# Patient Record
Sex: FEMALE | Race: BLACK OR AFRICAN AMERICAN | Employment: FULL TIME | URBAN - METROPOLITAN AREA
[De-identification: names, ages, dates, MRNs, and addresses within clinical notes are randomized per-mention and may not be internally consistent; named-entity substitution may affect disease eponyms.]

---

## 2019-05-08 ENCOUNTER — TELEPHONE (OUTPATIENT)
Dept: OBGYN CLINIC | Facility: CLINIC | Age: 49
End: 2019-05-08

## 2019-05-08 NOTE — TELEPHONE ENCOUNTER
PT SCHEDULED AN APPT WITH [facility] ON 5-14-19 FOR PHYSICAL AND SHE IS A NEW PT.  PT WANTS THE CODES FOR THE EXAM AND FOR ANY TESTING SHE WILL HAVE DONE. PT WILL BE REQUESTING PAP, HPV AND FULL STD SCREENING.   WILL ATTEMPT TO GET THE CODES TO PT.

## 2019-05-10 NOTE — TELEPHONE ENCOUNTER
LMTCB.  CPT CODE FOR THE APPT IS R9162573, CPT CODES FOR HEPBSAG = 69779, HEP C AB = [id number], TREP (SYPHILLUS) = [id number] AND HIV = [id number]. WAS UNABLE TO GET ANY OTHER CODES FOR PAP WITH GC/CHL,TRICH.

## 2019-05-14 ENCOUNTER — OFFICE VISIT (OUTPATIENT)
Dept: OBGYN CLINIC | Facility: CLINIC | Age: 49
End: 2019-05-14
Payer: COMMERCIAL

## 2019-05-14 ENCOUNTER — LAB ENCOUNTER (OUTPATIENT)
Dept: LAB | Facility: HOSPITAL | Age: 49
End: 2019-05-14
Attending: OBSTETRICS & GYNECOLOGY
Payer: COMMERCIAL

## 2019-05-14 VITALS — DIASTOLIC BLOOD PRESSURE: 86 MMHG | HEART RATE: 83 BPM | SYSTOLIC BLOOD PRESSURE: 139 MMHG | WEIGHT: 231 LBS

## 2019-05-14 DIAGNOSIS — Z12.31 VISIT FOR SCREENING MAMMOGRAM: ICD-10-CM

## 2019-05-14 DIAGNOSIS — Z01.419 ENCOUNTER FOR GYNECOLOGICAL EXAMINATION WITHOUT ABNORMAL FINDING: Primary | ICD-10-CM

## 2019-05-14 DIAGNOSIS — Z11.3 VENEREAL DISEASE SCREENING: ICD-10-CM

## 2019-05-14 DIAGNOSIS — R19.00 PELVIC MASS: ICD-10-CM

## 2019-05-14 PROCEDURE — 99212 OFFICE O/P EST SF 10 MIN: CPT | Performed by: OBSTETRICS & GYNECOLOGY

## 2019-05-14 PROCEDURE — 86780 TREPONEMA PALLIDUM: CPT

## 2019-05-14 PROCEDURE — 87389 HIV-1 AG W/HIV-1&-2 AB AG IA: CPT

## 2019-05-14 PROCEDURE — 87340 HEPATITIS B SURFACE AG IA: CPT

## 2019-05-14 PROCEDURE — 36415 COLL VENOUS BLD VENIPUNCTURE: CPT

## 2019-05-14 PROCEDURE — 99386 PREV VISIT NEW AGE 40-64: CPT | Performed by: OBSTETRICS & GYNECOLOGY

## 2019-05-14 NOTE — PROGRESS NOTES
Vandana Loredo is a 51year old female [id number] Patient's last menstrual period was 04/28/2019 (approximate). Patient presents with:  Gyn Exam: NP, Annual, mammogram order. She has no gyne complaints.   She has not been sexually active in several months of clubs or organizations: Not on file        Relationship status: Not on file      Intimate partner violence:        Fear of current or ex partner: Not on file        Emotionally abused: Not on file        Physically abused: Not on file        Forced sexu significant murmur  Abdomen:  soft, +tenderness to umbilicus with firm palpable mass, nondistended  Skin/Hair: no unusual rashes or bruises  Extremities: no edema, no cyanosis  Psychiatric:  Oriented to time, place, person and situation.  Appropriate mood a

## 2019-05-20 ENCOUNTER — TELEPHONE (OUTPATIENT)
Dept: OBGYN CLINIC | Facility: CLINIC | Age: 49
End: 2019-05-20

## 2019-05-20 NOTE — TELEPHONE ENCOUNTER
----- Message from Alexsandra Little MD sent at 5/17/2019  7:36 AM CDT -----  Negative syphylis,hepatitis,gc/chlamydia,HIV ,call pt

## 2019-05-21 ENCOUNTER — HOSPITAL ENCOUNTER (OUTPATIENT)
Dept: MAMMOGRAPHY | Age: 49
Discharge: HOME | End: 2019-05-21
Attending: OBSTETRICS & GYNECOLOGY
Payer: COMMERCIAL

## 2019-05-21 ENCOUNTER — HOSPITAL ENCOUNTER (OUTPATIENT)
Dept: ULTRASOUND IMAGING | Age: 49
Discharge: HOME | End: 2019-05-21
Attending: OBSTETRICS & GYNECOLOGY
Payer: COMMERCIAL

## 2019-05-21 DIAGNOSIS — Z12.31 VISIT FOR SCREENING MAMMOGRAM: ICD-10-CM

## 2019-05-21 DIAGNOSIS — R19.00 PELVIC MASS: ICD-10-CM

## 2019-05-21 PROCEDURE — 77067 SCR MAMMO BI INCL CAD: CPT | Performed by: OBSTETRICS & GYNECOLOGY

## 2019-05-21 PROCEDURE — 76830 TRANSVAGINAL US NON-OB: CPT | Performed by: OBSTETRICS & GYNECOLOGY

## 2019-05-21 PROCEDURE — 77063 BREAST TOMOSYNTHESIS BI: CPT | Performed by: OBSTETRICS & GYNECOLOGY

## 2019-05-21 PROCEDURE — 76856 US EXAM PELVIC COMPLETE: CPT | Performed by: OBSTETRICS & GYNECOLOGY

## 2019-05-26 ENCOUNTER — PATIENT MESSAGE (OUTPATIENT)
Dept: OBGYN CLINIC | Facility: CLINIC | Age: 49
End: 2019-05-26

## 2019-05-28 ENCOUNTER — TELEPHONE (OUTPATIENT)
Dept: OBGYN CLINIC | Facility: CLINIC | Age: 49
End: 2019-05-28

## 2019-05-28 NOTE — TELEPHONE ENCOUNTER
See other my chart message as well.      [facility] recs for pts US:   Notes recorded by Ave Rosa MD on 5/26/2019 at 7:21 PM CDT  Per our plan at visit, pt is supposed to come in and discuss these results with me and will make appt    Recs for mammo:  Note

## 2019-05-28 NOTE — TELEPHONE ENCOUNTER
Pt was told to schedule appt to see [facility] to discuss mammo u/s results, but [place] and wont be able to call off work again anytime soon.  Pt is available on Fridays and since [facility] has no other availability on Fridays, pt is asking if todays 3pm zakia

## 2019-05-28 NOTE — TELEPHONE ENCOUNTER
From: Faye Valente  To: Jenn De La Cruz MD  Sent: 5/26/2019 1:36 PM CDT  Subject: Test Results Question    I have a question about [facility] SOURAV 2D+3D SCREENING BILAT (CPT=77067/25080) resulted on 5/22/19, 7:18 AM.    How do I set up additional diagnostic

## 2019-05-28 NOTE — TELEPHONE ENCOUNTER
Pt states she is in [address] on vacation and requesting a sooner appt than August to come in to discuss results. Pt accepted first available appt on a Friday 6/14. Pt also put on waitlist for a Friday appt.

## 2019-05-28 NOTE — TELEPHONE ENCOUNTER
From: Bobby Menjivar  To: Luz Kay MD  Sent: 5/26/2019 1:32 PM CDT  Subject: Test Results Question    I have a question about Ultrasound Scan Pelvis resulted on 5/22/19, 12:05 PM. I'd like to schedule the pelvic MRI as soon as practicable and on

## 2019-06-06 ENCOUNTER — TELEPHONE (OUTPATIENT)
Dept: FAMILY MEDICINE CLINIC | Facility: CLINIC | Age: 49
End: 2019-06-06

## 2019-06-06 ENCOUNTER — LAB ENCOUNTER (OUTPATIENT)
Dept: LAB | Age: 49
End: 2019-06-06
Attending: FAMILY MEDICINE
Payer: COMMERCIAL

## 2019-06-06 ENCOUNTER — OFFICE VISIT (OUTPATIENT)
Dept: FAMILY MEDICINE CLINIC | Facility: CLINIC | Age: 49
End: 2019-06-06
Payer: COMMERCIAL

## 2019-06-06 VITALS
RESPIRATION RATE: 17 BRPM | HEART RATE: 77 BPM | BODY MASS INDEX: 40.75 KG/M2 | SYSTOLIC BLOOD PRESSURE: 144 MMHG | DIASTOLIC BLOOD PRESSURE: 80 MMHG | HEIGHT: 63 IN | WEIGHT: 230 LBS

## 2019-06-06 DIAGNOSIS — R09.81 NASAL SINUS CONGESTION: ICD-10-CM

## 2019-06-06 DIAGNOSIS — M79.89 LEFT LEG SWELLING: ICD-10-CM

## 2019-06-06 DIAGNOSIS — R03.0 ELEVATED BLOOD PRESSURE READING: ICD-10-CM

## 2019-06-06 DIAGNOSIS — Z67.90 UNSPECIFIED BLOOD TYPE, RH POSITIVE: ICD-10-CM

## 2019-06-06 DIAGNOSIS — E66.01 MORBID OBESITY (HCC): ICD-10-CM

## 2019-06-06 DIAGNOSIS — J30.9 ALLERGIC RHINITIS, UNSPECIFIED SEASONALITY, UNSPECIFIED TRIGGER: ICD-10-CM

## 2019-06-06 DIAGNOSIS — Z76.89 ESTABLISHING CARE WITH NEW DOCTOR, ENCOUNTER FOR: ICD-10-CM

## 2019-06-06 DIAGNOSIS — Z76.89 ESTABLISHING CARE WITH NEW DOCTOR, ENCOUNTER FOR: Primary | ICD-10-CM

## 2019-06-06 DIAGNOSIS — Z87.891 FORMER SMOKER: ICD-10-CM

## 2019-06-06 DIAGNOSIS — Z82.49 FAMILY HISTORY OF PULMONARY HYPERTENSION: ICD-10-CM

## 2019-06-06 PROCEDURE — 86850 RBC ANTIBODY SCREEN: CPT

## 2019-06-06 PROCEDURE — 86900 BLOOD TYPING SEROLOGIC ABO: CPT

## 2019-06-06 PROCEDURE — 86901 BLOOD TYPING SEROLOGIC RH(D): CPT

## 2019-06-06 PROCEDURE — 93000 ELECTROCARDIOGRAM COMPLETE: CPT | Performed by: FAMILY MEDICINE

## 2019-06-06 PROCEDURE — 80053 COMPREHEN METABOLIC PANEL: CPT

## 2019-06-06 PROCEDURE — 80061 LIPID PANEL: CPT

## 2019-06-06 PROCEDURE — 84443 ASSAY THYROID STIM HORMONE: CPT

## 2019-06-06 PROCEDURE — 85025 COMPLETE CBC W/AUTO DIFF WBC: CPT

## 2019-06-06 PROCEDURE — 85060 BLOOD SMEAR INTERPRETATION: CPT

## 2019-06-06 PROCEDURE — 36415 COLL VENOUS BLD VENIPUNCTURE: CPT

## 2019-06-06 PROCEDURE — 93005 ELECTROCARDIOGRAM TRACING: CPT | Performed by: FAMILY MEDICINE

## 2019-06-06 PROCEDURE — 99386 PREV VISIT NEW AGE 40-64: CPT | Performed by: FAMILY MEDICINE

## 2019-06-06 PROCEDURE — 81003 URINALYSIS AUTO W/O SCOPE: CPT

## 2019-06-06 NOTE — PATIENT INSTRUCTIONS
Medication reviewed and renewed where needed and appropriate. Comply with medications. Monitor blood pressures and record at home. Limit salt intake. Recommend weight loss via daily exercising and consistent healthy dietary changes.   Encouraged physical

## 2019-06-06 NOTE — PROGRESS NOTES
HPI:    Patient ID: Yadi Warner is a 51year old female.     51year old AA female here for complete preventive care physical and for status update on any confirmed chronic medical illnesses and follow up on any previous labs or procedures that were brady goal when manually rechecked  - ELECTROCARDIOGRAM, COMPLETE; Normal sinus rate and rhythm. No Q waves. No ST-T wave changes. Normal EKG  - CBC WITH DIFFERENTIAL WITH PLATELET; Future  - COMP METABOLIC PANEL (14); Future    3.  Left leg swelling  Swelling m 6/27/2019), or if symptoms worsen or fail to improve.          #[phone]

## 2019-06-07 ENCOUNTER — HOSPITAL ENCOUNTER (OUTPATIENT)
Dept: GENERAL RADIOLOGY | Age: 49
Discharge: HOME | End: 2019-06-07
Attending: FAMILY MEDICINE
Payer: COMMERCIAL

## 2019-06-07 DIAGNOSIS — D64.9 SEVERE ANEMIA: ICD-10-CM

## 2019-06-07 DIAGNOSIS — Z87.891 FORMER SMOKER: ICD-10-CM

## 2019-06-07 DIAGNOSIS — R06.83 SNORING: Primary | ICD-10-CM

## 2019-06-07 DIAGNOSIS — Z12.11 COLON CANCER SCREENING: ICD-10-CM

## 2019-06-07 DIAGNOSIS — R29.818 SUSPECTED SLEEP APNEA: ICD-10-CM

## 2019-06-07 PROCEDURE — 71046 X-RAY EXAM CHEST 2 VIEWS: CPT | Performed by: FAMILY MEDICINE

## 2019-06-07 RX ORDER — FERROUS SULFATE 325(65) MG
325 TABLET ORAL 2 TIMES DAILY WITH MEALS
Qty: 60 TABLET | Refills: 2 | Status: SHIPPED | OUTPATIENT
Start: 2019-06-07 | End: 2019-10-04

## 2019-06-07 NOTE — TELEPHONE ENCOUNTER
Paging    Message # [phone] 12 [phone] 09:42p   [[email or web address]]  To:  From:  DREA Lynne MD:  Phone#:  ----------------------------------------------------------------------  AAMIR CARLOS [phone] RE PT SHERI Thelda Goldmann,  [birth year], LAB RESULTS  Paged at n

## 2019-06-10 NOTE — TELEPHONE ENCOUNTER
Patient called and informed about her labs. She has been referred to hematology and was advised to start iron supplementation and keep appointment to complete the gyne workup.

## 2019-06-14 ENCOUNTER — TELEPHONE (OUTPATIENT)
Dept: OBGYN CLINIC | Facility: CLINIC | Age: 49
End: 2019-06-14

## 2019-06-14 ENCOUNTER — OFFICE VISIT (OUTPATIENT)
Dept: OBGYN CLINIC | Facility: CLINIC | Age: 49
End: 2019-06-14
Payer: COMMERCIAL

## 2019-06-14 ENCOUNTER — OFFICE VISIT (OUTPATIENT)
Dept: OTOLARYNGOLOGY | Facility: CLINIC | Age: 49
End: 2019-06-14
Payer: COMMERCIAL

## 2019-06-14 VITALS
DIASTOLIC BLOOD PRESSURE: 81 MMHG | BODY MASS INDEX: 40.29 KG/M2 | HEIGHT: 63 IN | SYSTOLIC BLOOD PRESSURE: 136 MMHG | WEIGHT: 227.38 LBS | TEMPERATURE: 98 F

## 2019-06-14 VITALS
DIASTOLIC BLOOD PRESSURE: 79 MMHG | SYSTOLIC BLOOD PRESSURE: 145 MMHG | HEART RATE: 98 BPM | BODY MASS INDEX: 40 KG/M2 | WEIGHT: 226 LBS

## 2019-06-14 DIAGNOSIS — J34.2 DEVIATED NASAL SEPTUM: ICD-10-CM

## 2019-06-14 DIAGNOSIS — R09.81 NASAL CONGESTION: Primary | ICD-10-CM

## 2019-06-14 DIAGNOSIS — D64.9 SEVERE ANEMIA: ICD-10-CM

## 2019-06-14 DIAGNOSIS — N84.0 ENDOMETRIAL POLYP: ICD-10-CM

## 2019-06-14 DIAGNOSIS — D25.1 INTRAMURAL UTERINE FIBROID: Primary | ICD-10-CM

## 2019-06-14 PROCEDURE — 99212 OFFICE O/P EST SF 10 MIN: CPT | Performed by: OTOLARYNGOLOGY

## 2019-06-14 PROCEDURE — 99214 OFFICE O/P EST MOD 30 MIN: CPT | Performed by: OBSTETRICS & GYNECOLOGY

## 2019-06-14 PROCEDURE — 99243 OFF/OP CNSLTJ NEW/EST LOW 30: CPT | Performed by: OTOLARYNGOLOGY

## 2019-06-14 NOTE — PROGRESS NOTES
Samaria Zavala    8/10/1970       Patient presents with:  Consult: DISCUSS RESULTS   pt hgb was 6.8. Pt states that she had some fatigue but otherwise was not symptomatic. She was placed on iron supp by her pcp Dr Timothy Arizmendi.   Pt menses last for 5-7 da

## 2019-06-14 NOTE — PROGRESS NOTES
Lacie Garcia is a 51year old female. Patient presents with:  Nose Problem: chronic nasal congestion      HISTORY OF PRESENT ILLNESS  She presents with a history of chronic nasal congestion for years.   She states that she is been caring for a family m Other (pancreatic cancer) Maternal Aunt    • Breast Cancer Maternal Cousin Female    • Breast Cancer Paternal Cousin Female        Past Medical History:   Diagnosis Date   • Anemia        History reviewed. No pertinent surgical history.       REVIEW OF SYST cervical. Supraclavicular.         Nose/Mouth/Throat Normal External nose - Normal. Lips/teeth/gums - Normal. Tonsils - Normal. Oropharynx - Normal.   Nose/Mouth/Throat Normal Nares - Right: Normal Left: Normal. Septum -deviated to the left turbinates - Rig

## 2019-06-14 NOTE — TELEPHONE ENCOUNTER
Per Previous note no PA is required for this procedure. Pt was informed and give procedure and diagnosis codes.

## 2019-06-15 PROBLEM — D25.1 INTRAMURAL LEIOMYOMA OF UTERUS: Status: ACTIVE | Noted: 2019-06-15

## 2019-06-15 PROBLEM — D64.9 SEVERE ANEMIA: Status: ACTIVE | Noted: 2019-06-15

## 2019-07-19 ENCOUNTER — HOSPITAL ENCOUNTER (OUTPATIENT)
Dept: MAMMOGRAPHY | Facility: HOSPITAL | Age: 49
Discharge: HOME | End: 2019-07-19
Attending: OBSTETRICS & GYNECOLOGY
Payer: COMMERCIAL

## 2019-07-19 DIAGNOSIS — R92.8 ABNORMAL MAMMOGRAM: ICD-10-CM

## 2019-07-19 PROCEDURE — 77061 BREAST TOMOSYNTHESIS UNI: CPT | Performed by: OBSTETRICS & GYNECOLOGY

## 2019-07-19 PROCEDURE — 77065 DX MAMMO INCL CAD UNI: CPT | Performed by: OBSTETRICS & GYNECOLOGY

## 2019-09-16 ENCOUNTER — OFFICE VISIT (OUTPATIENT)
Dept: CARDIOLOGY CLINIC | Facility: CLINIC | Age: 49
End: 2019-09-16
Payer: COMMERCIAL

## 2019-09-16 VITALS
DIASTOLIC BLOOD PRESSURE: 84 MMHG | RESPIRATION RATE: 18 BRPM | BODY MASS INDEX: 38.45 KG/M2 | WEIGHT: 217 LBS | HEART RATE: 72 BPM | HEIGHT: 63 IN | SYSTOLIC BLOOD PRESSURE: 133 MMHG

## 2019-09-16 DIAGNOSIS — Z82.49 FAMILY HISTORY OF CORONARY ARTERY DISEASE: ICD-10-CM

## 2019-09-16 DIAGNOSIS — R03.0 ELEVATED BLOOD PRESSURE READING: Primary | ICD-10-CM

## 2019-09-16 DIAGNOSIS — R06.00 DYSPNEA ON EXERTION: ICD-10-CM

## 2019-09-16 PROBLEM — R06.09 DYSPNEA ON EXERTION: Status: ACTIVE | Noted: 2019-09-16

## 2019-09-16 PROCEDURE — 99244 OFF/OP CNSLTJ NEW/EST MOD 40: CPT | Performed by: INTERNAL MEDICINE

## 2019-09-16 NOTE — PROGRESS NOTES
Name:  Fay Hogan  : 8/10/1970    Date of consultation:   2019    Referring physician: Marialuisa Bynum DO    Reason for Visit:  Patient presents with:  Consult: ref by Dr Aline Hudson, family hx CAD  Hypertension  Edema: lower extremity mildred Cigarettes        Quit date: 2017        Years since quittin.2      Smokeless tobacco: Never Used    Alcohol use: Yes      Frequency: Never    Drug use: Never       ROS:   GENERAL HEALTH: no fevers, chills, sweats  SKIN: no unusual skin lesions or 2D DOPPLER (CPT=93306); Future    2. Family history of coronary artery disease  Recommend coronary calcium score. Depending on level of calcium may need a stress test or more aggressive lipid management    3.  Dyspnea on exertion  Schedule echocardiogram.

## 2019-09-17 ENCOUNTER — OFFICE VISIT (OUTPATIENT)
Dept: PULMONOLOGY | Facility: CLINIC | Age: 49
End: 2019-09-17
Payer: COMMERCIAL

## 2019-09-17 ENCOUNTER — TELEPHONE (OUTPATIENT)
Dept: CARDIOLOGY CLINIC | Facility: CLINIC | Age: 49
End: 2019-09-17

## 2019-09-17 VITALS
OXYGEN SATURATION: 100 % | WEIGHT: 219 LBS | SYSTOLIC BLOOD PRESSURE: 146 MMHG | HEIGHT: 63 IN | BODY MASS INDEX: 38.8 KG/M2 | RESPIRATION RATE: 18 BRPM | DIASTOLIC BLOOD PRESSURE: 81 MMHG | HEART RATE: 69 BPM

## 2019-09-17 DIAGNOSIS — R06.00 DYSPNEA ON EXERTION: Primary | ICD-10-CM

## 2019-09-17 PROCEDURE — 99243 OFF/OP CNSLTJ NEW/EST LOW 30: CPT | Performed by: INTERNAL MEDICINE

## 2019-09-17 NOTE — PROGRESS NOTES
Dear Cristina Stringer:           As you know, Elisha Clark is a 60-year-old female who I am now evaluating for mild dyspnea on exertion and family history of pulmonary hypertension.     HISTORY OF PRESENT ILLNESS: The patient notes that she has had a mild uncomfortabl appreciable. Extremities without clubbing cyanosis nor edema. Neurologic grossly intact with symmetric tone and strength and reflex.     LABORATORY: Chest x-ray June 2019–negative    ASSESSMENT AND PLAN:  PROBLEM 1.  Mild dyspnea on exertion with family his

## 2019-09-17 NOTE — TELEPHONE ENCOUNTER
Scheduled for 9/27/19. CARD ECHO 2D DOPPLER (CPT=93306) [[id number]]  Order #: [id number].  #:[id number] FUTURE   Priority: Routine  Class: EHV - RFL   Resulting Agency: [facility]  Test ID: [id number]  Future Order Information    Expires on:09/1

## 2019-09-18 NOTE — TELEPHONE ENCOUNTER
S/w Marjorie Velazquez at [facility] no PA required. Call reference number [id number]    Workqueue updated.  Patient notified via [address] [place] [address] [address]

## 2019-09-23 DIAGNOSIS — G47.33 OSA ON CPAP: Primary | ICD-10-CM

## 2019-09-23 DIAGNOSIS — Z99.89 OSA ON CPAP: Primary | ICD-10-CM

## 2019-09-27 ENCOUNTER — OFFICE VISIT (OUTPATIENT)
Dept: GASTROENTEROLOGY | Facility: CLINIC | Age: 49
End: 2019-09-27
Payer: COMMERCIAL

## 2019-09-27 ENCOUNTER — TELEPHONE (OUTPATIENT)
Dept: GASTROENTEROLOGY | Facility: CLINIC | Age: 49
End: 2019-09-27

## 2019-09-27 ENCOUNTER — HOSPITAL ENCOUNTER (OUTPATIENT)
Dept: CV DIAGNOSTICS | Facility: HOSPITAL | Age: 49
Discharge: HOME | End: 2019-09-27
Attending: INTERNAL MEDICINE
Payer: COMMERCIAL

## 2019-09-27 VITALS
SYSTOLIC BLOOD PRESSURE: 139 MMHG | HEIGHT: 63 IN | DIASTOLIC BLOOD PRESSURE: 83 MMHG | HEART RATE: 67 BPM | BODY MASS INDEX: 38.45 KG/M2 | WEIGHT: 217 LBS

## 2019-09-27 DIAGNOSIS — R06.00 DYSPNEA ON EXERTION: ICD-10-CM

## 2019-09-27 DIAGNOSIS — R03.0 ELEVATED BLOOD PRESSURE READING: ICD-10-CM

## 2019-09-27 DIAGNOSIS — D50.9 IRON DEFICIENCY ANEMIA, UNSPECIFIED IRON DEFICIENCY ANEMIA TYPE: Primary | ICD-10-CM

## 2019-09-27 DIAGNOSIS — D64.9 SEVERE ANEMIA: ICD-10-CM

## 2019-09-27 PROCEDURE — 93306 TTE W/DOPPLER COMPLETE: CPT | Performed by: INTERNAL MEDICINE

## 2019-09-27 PROCEDURE — 99243 OFF/OP CNSLTJ NEW/EST LOW 30: CPT | Performed by: INTERNAL MEDICINE

## 2019-09-27 RX ORDER — POLYETHYLENE GLYCOL 3350, SODIUM CHLORIDE, SODIUM BICARBONATE, POTASSIUM CHLORIDE 420; 11.2; 5.72; 1.48 G/4L; G/4L; G/4L; G/4L
POWDER, FOR SOLUTION ORAL
Qty: 1 BOTTLE | Refills: 0 | Status: SHIPPED | OUTPATIENT
Start: 2019-09-27 | End: 2019-12-03 | Stop reason: ALTCHOICE

## 2019-09-27 NOTE — H&P
[facility], [facility] - Gastroenterology                                                                                                               Reason for consult: i Frequency: Never    Drug use: Never       Medications (Active prior to today's visit):    Current Outpatient Medications:  PEG 3350-KCl-Na Bicarb-NaCl (TRILYTE) 420 g Oral Recon Soln As directed.  Disp: 1 Bottle Rfl: 0   Ferrous Sulfate 325 (65 Fe) MG Oral screening colonoscopy. Patient is currently asymptomatic and denies diarrhea, hematochezia, thin-stools or weight loss.  We discussed risks/benefits/alternatives to procedure, including CT colonography and stool testing, they want to proceed with colonoscop

## 2019-09-27 NOTE — TELEPHONE ENCOUNTER
Scheduled for:  Colonoscopy [address] and EGD [address]  Provider Name: Dr. Mario Holloway  Date:  10/28/19  Location:  [facility]  Sedation:  MAC  Time:   8344 (pt is aware that [facility] will call the day before to confirm arrival time) I did request to schedule at late as possible p

## 2019-09-27 NOTE — PATIENT INSTRUCTIONS
1. Schedule colonoscopy/EGD with MAC [Diagnosis: screening, iron deficiency anemia]    2.  bowel prep from pharmacy ([facility])    3. Continue all medications for procedure, except hold IRON for 1 week before procedure    4.  Read all bowel prep

## 2019-09-30 ENCOUNTER — LAB ENCOUNTER (OUTPATIENT)
Dept: LAB | Facility: HOSPITAL | Age: 49
End: 2019-09-30
Attending: INTERNAL MEDICINE
Payer: COMMERCIAL

## 2019-09-30 DIAGNOSIS — D50.9 IRON DEFICIENCY ANEMIA, UNSPECIFIED IRON DEFICIENCY ANEMIA TYPE: ICD-10-CM

## 2019-09-30 PROCEDURE — 85025 COMPLETE CBC W/AUTO DIFF WBC: CPT

## 2019-09-30 PROCEDURE — 36415 COLL VENOUS BLD VENIPUNCTURE: CPT

## 2019-09-30 PROCEDURE — 84466 ASSAY OF TRANSFERRIN: CPT

## 2019-09-30 PROCEDURE — 83540 ASSAY OF IRON: CPT

## 2019-09-30 PROCEDURE — 82728 ASSAY OF FERRITIN: CPT

## 2019-10-03 ENCOUNTER — PATIENT MESSAGE (OUTPATIENT)
Dept: FAMILY MEDICINE CLINIC | Facility: CLINIC | Age: 49
End: 2019-10-03

## 2019-10-04 RX ORDER — FERROUS SULFATE 325(65) MG
325 TABLET ORAL 2 TIMES DAILY WITH MEALS
Qty: 180 TABLET | Refills: 1 | Status: SHIPPED | OUTPATIENT
Start: 2019-10-04 | End: 2020-07-17

## 2019-10-04 NOTE — TELEPHONE ENCOUNTER
Review pended refill request as it does not fall under a protocol.   Refill Protocol Appointment Criteria  · Appointment scheduled in the past 6 months or in the next 3 months  Recent Outpatient Visits            1 week ago Iron deficiency anemia, unspecifi

## 2019-10-04 NOTE — TELEPHONE ENCOUNTER
From: Carmen Armijo  To: Skylar Bustamante DO  Sent: 10/3/2019 8:20 PM CDT  Subject: Prescription Question    May I have a refill of the Ferrous Sulfate 325mg tabs with 2-3 refills?

## 2019-10-11 ENCOUNTER — HOSPITAL ENCOUNTER (OUTPATIENT)
Dept: CT IMAGING | Age: 49
Discharge: HOME | End: 2019-10-11
Attending: FAMILY MEDICINE

## 2019-10-11 DIAGNOSIS — Z13.9 ENCOUNTER FOR SCREENING: ICD-10-CM

## 2019-10-12 ENCOUNTER — OFFICE VISIT (OUTPATIENT)
Dept: SLEEP CENTER | Age: 49
End: 2019-10-12
Attending: FAMILY MEDICINE
Payer: COMMERCIAL

## 2019-10-12 DIAGNOSIS — R29.818 SUSPECTED SLEEP APNEA: ICD-10-CM

## 2019-10-12 DIAGNOSIS — Z99.89 OSA ON CPAP: ICD-10-CM

## 2019-10-12 DIAGNOSIS — R06.83 SNORING: ICD-10-CM

## 2019-10-12 DIAGNOSIS — G47.33 OSA ON CPAP: ICD-10-CM

## 2019-10-12 PROCEDURE — 95811 POLYSOM 6/>YRS CPAP 4/> PARM: CPT

## 2019-10-16 NOTE — PROCEDURES
[address] [facility]  Accredited by the [facility] Sleep Medicine ([facility])    PATIENT'S NAME: Jaz Brody   ATTENDING PHYSICIAN: Essie Aguilar DO   REFERRING PHYSICIAN: Essie Aguilar DO   PATIENT ACCOUNT #: [de-identified] LOCATION: S events per hour of which 4.8 per hour were associated with arousal.  The lowest desaturation was to 87%. The average heart rate is 89 beats per minute, and there was no significant bradycardia, asystole, tachycardia, nor atrial fibrillation.   CPAP was ini

## 2019-10-25 ENCOUNTER — PATIENT MESSAGE (OUTPATIENT)
Dept: FAMILY MEDICINE CLINIC | Facility: CLINIC | Age: 49
End: 2019-10-25

## 2019-10-28 ENCOUNTER — LAB REQUISITION (OUTPATIENT)
Dept: LAB | Facility: HOSPITAL | Age: 49
End: 2019-10-28
Payer: COMMERCIAL

## 2019-10-28 DIAGNOSIS — Z01.89 ENCOUNTER FOR OTHER SPECIFIED SPECIAL EXAMINATIONS: ICD-10-CM

## 2019-10-28 PROCEDURE — 88305 TISSUE EXAM BY PATHOLOGIST: CPT | Performed by: INTERNAL MEDICINE

## 2019-10-28 PROCEDURE — 88312 SPECIAL STAINS GROUP 1: CPT | Performed by: INTERNAL MEDICINE

## 2019-10-29 NOTE — TELEPHONE ENCOUNTER
Dr. Daphne Trinh have you seen the patient's CPAP titration study yet? It does not seem pt has been called with results from this or DR. Wick's office. However I do see that a CPAP machine was already ordered.      If pt has been called with results I can take

## 2019-10-30 ENCOUNTER — PATIENT MESSAGE (OUTPATIENT)
Dept: GASTROENTEROLOGY | Facility: CLINIC | Age: 49
End: 2019-10-30

## 2019-10-30 NOTE — TELEPHONE ENCOUNTER
From: Diana Moralez  To: Herminio Hurley MD  Sent: 10/30/2019 1:20 PM CDT  Subject: Other    I had the colonoscopy on Monday, went to work Tuesday & felt faint.  Was transported via ambulance to [facility] (the closet to my job) & attached ar

## 2019-10-31 ENCOUNTER — TELEPHONE (OUTPATIENT)
Dept: GASTROENTEROLOGY | Facility: CLINIC | Age: 49
End: 2019-10-31

## 2019-10-31 DIAGNOSIS — K76.9 LIVER LESION, RIGHT LOBE: Primary | ICD-10-CM

## 2019-10-31 NOTE — TELEPHONE ENCOUNTER
Pt contacted and reviewed Dr. Alexia Madera message below. She verbalized understanding and will call central scheduling to schedule MRI liver in 2-3 wks to ensure [employer] PA approval is obtained. She will f/u with [facility] directly for update on PA status as well.      Sh

## 2019-10-31 NOTE — TELEPHONE ENCOUNTER
I mailed out colonoscopy/EGD results letter to pt  Updated health maintenance  Entered into 10 yr CLN recall  Recall colon in 10 years per.  Colon/EGD done 10/28/19    GI staff: please place recall in for colonoscopy in 10 years

## 2019-10-31 NOTE — TELEPHONE ENCOUNTER
Samaria notified me about [facility] ER visit after her c-scope procedure.  Sent attachments in her mychart from a CT A/P from this week:      -no acute findings  -submucosal fat within colon  -indeterminate hypodense lesion in R hep lobe liver, and R inferior R he

## 2019-10-31 NOTE — TELEPHONE ENCOUNTER
Pt returned call. Please call [phone] first, then call cell # [phone]. Attempted to reach RN/ not able to locate.

## 2019-10-31 NOTE — TELEPHONE ENCOUNTER
See TE from 10/31/2019 as this message has been addressed.     Future Appointments   Date Time Provider Ajit Vivian   11/22/2019  4:00 PM [facility] MRI RM1 (1.5T) [facility] MRI EM [facility]

## 2019-11-01 ENCOUNTER — TELEPHONE (OUTPATIENT)
Dept: PULMONOLOGY | Facility: CLINIC | Age: 49
End: 2019-11-01

## 2019-11-01 DIAGNOSIS — G47.33 OSA (OBSTRUCTIVE SLEEP APNEA): Primary | ICD-10-CM

## 2019-11-01 NOTE — TELEPHONE ENCOUNTER
Pt called to review sleep study results with RN or Dr. Osmel Juarez and also to know what next step will be. Please call. Aware PJC out of office.

## 2019-11-03 ENCOUNTER — PATIENT MESSAGE (OUTPATIENT)
Dept: OBGYN CLINIC | Facility: CLINIC | Age: 49
End: 2019-11-03

## 2019-11-04 NOTE — TELEPHONE ENCOUNTER
WE TRIED TO SCHEDULE ENDOSEE IN June AND October. YOU HAVE NO OPENINGS THIS WEEK.   WILL YOU ADD PT SOMEWHERE?

## 2019-11-04 NOTE — TELEPHONE ENCOUNTER
Endosee needs to be done days 7-10 of cycle. Please call pt to see if she is available on 11/13 at 2:40. I blocked the NOB and procedure room for that time.

## 2019-11-04 NOTE — TELEPHONE ENCOUNTER
From: Carly Arvizu  To: Jb Mcleod.  MD Naina  Sent: 11/3/2019 7:12 PM CST  Subject: Other    Today (Sunday, Nov. 3rd) is the first day of my period

## 2019-11-04 NOTE — TELEPHONE ENCOUNTER
MESSAGE GIVEN TO ALEX TO SCHEDULE. ALEX IS WORKING ON TRYING TO GET PERSONNEL TO STAY LATE TO DO THE TEST AT THE END OF [facility]'S SCHEDULE.

## 2019-11-05 ENCOUNTER — TELEPHONE (OUTPATIENT)
Dept: OBGYN CLINIC | Facility: CLINIC | Age: 49
End: 2019-11-05

## 2019-11-05 NOTE — TELEPHONE ENCOUNTER
SPOKE WITH PT AND SHE ACCEPTED THE APPT ON 11-13-19 AT 2:40PM.  PT WILL BRING THE ULTRASOUND REPORT WITH HER TO THE APPT TO DISCUSS WITH [facility]. QUESTIONS ABOUT ENDOSEE ANSWERED.

## 2019-11-05 NOTE — TELEPHONE ENCOUNTER
See pt email 10/19. Spoke to pt informed her of [employer] [employer] message in 10/19 encounter. Pt voiced understanding. Pt provided with [address] and phone number [phone]. Order,Facehseet,LOV and sleep studies faxed to [facility] [phone].

## 2019-11-06 NOTE — TELEPHONE ENCOUNTER
Spoke with [facility] regarding pts request. CAP feels that pt may be better off having procedure in the OR. I informed pt of this and she still wants to go ahead with Endosee in the office.  Message to CAP as FAMI and order for anxiety medication to be sent to [facility]

## 2019-11-06 NOTE — TELEPHONE ENCOUNTER
Spoke with pt regarding Endosee procedure that is scheduled on 11/13/19 with [facility]. Asked pt if she has ever had D&C for previous miscarriage in the past. Pt. States that she has, but would still like Cytotec and an RX for her anxiety . Pt.  Has bad experienc

## 2019-11-07 NOTE — TELEPHONE ENCOUNTER
It appears that Dr. Ronen Tay, pulmonary/sleep specialist has already discussed the patient's results and she was given the information to contact [facility] to begin her treatment.

## 2019-11-11 RX ORDER — MISOPROSTOL 200 UG/1
400 TABLET ORAL SEE ADMIN INSTRUCTIONS
Qty: 2 TABLET | Refills: 0 | Status: SHIPPED | OUTPATIENT
Start: 2019-11-11 | End: 2019-12-03 | Stop reason: ALTCHOICE

## 2019-11-11 RX ORDER — ALPRAZOLAM 1 MG/1
1 TABLET ORAL NIGHTLY PRN
Qty: 1 TABLET | Refills: 0 | Status: SHIPPED | OUTPATIENT
Start: 2019-11-11 | End: 2019-12-03

## 2019-11-11 NOTE — TELEPHONE ENCOUNTER
CAP you will have to do the Xanax, it will not allow me to do it. Please let us know when you have done it, so pt can be informed. Also see notes below, do you want a rx sent for cytotec and how much?

## 2019-11-13 ENCOUNTER — OFFICE VISIT (OUTPATIENT)
Dept: OBGYN CLINIC | Facility: CLINIC | Age: 49
End: 2019-11-13
Payer: COMMERCIAL

## 2019-11-13 VITALS
BODY MASS INDEX: 35 KG/M2 | DIASTOLIC BLOOD PRESSURE: 83 MMHG | HEART RATE: 64 BPM | WEIGHT: 199 LBS | SYSTOLIC BLOOD PRESSURE: 120 MMHG

## 2019-11-13 DIAGNOSIS — N92.0 EXCESSIVE AND FREQUENT MENSTRUATION WITH REGULAR CYCLE: ICD-10-CM

## 2019-11-13 DIAGNOSIS — N94.89 ENDOMETRIAL MASS: ICD-10-CM

## 2019-11-13 PROCEDURE — 58555 HYSTEROSCOPY DX SEP PROC: CPT | Performed by: OBSTETRICS & GYNECOLOGY

## 2019-11-13 NOTE — TELEPHONE ENCOUNTER
PT NOTIFIED OF RECS. WILL TAKE ALEVE TONIGHT TO HELP WITH CRAMPING FROM CYTOTEC ALSO. SHE CONFIRMED SHE HAS SOMEONE TO DRIVE HER TO AND FROM THE APPT.

## 2019-11-14 ENCOUNTER — TELEPHONE (OUTPATIENT)
Dept: OBGYN CLINIC | Facility: CLINIC | Age: 49
End: 2019-11-14

## 2019-11-14 DIAGNOSIS — N94.89 ENDOMETRIAL MASS: ICD-10-CM

## 2019-11-14 DIAGNOSIS — N92.0 MENORRHAGIA WITH REGULAR CYCLE: Primary | ICD-10-CM

## 2019-11-14 NOTE — PROCEDURES
Endosee Procedure       Pregnancy Results: negative  Birth control method(s) used: none    Consent signed. Procedure discussed with the patient in detail including indication, risks, benefits, alternatives and complications.     Indication:  menorrhagia,

## 2019-11-14 NOTE — TELEPHONE ENCOUNTER
OB GYN SURGICAL SCHEDULING    Assessment:menorrhagia, endometrial mass  Pre-Operative Procedure:  Hysteroscopy - surgical, myosure resection of submucous fibroid and endometrial polyp    In / on: day 7-10 of cycle    Admission:  Day Surgery    Anesthesia: General    Additional Orders:  Routine Orders    Comments / Orders to Nurse: none    Discussed possible complications including but not limited to: Alternatives to surgical intervention discussed with patient in detail., Likely consequences of not undergoing procedure discussed with patient. , anesthesia risks, Ashermann's syndrome, bleeding, infection, injury, internal, need for future surgery, perforation of uterus and surgery may not improve symptoms

## 2019-11-20 ENCOUNTER — OFFICE VISIT (OUTPATIENT)
Dept: OBGYN CLINIC | Facility: CLINIC | Age: 49
End: 2019-11-20
Payer: COMMERCIAL

## 2019-11-20 VITALS
HEART RATE: 69 BPM | DIASTOLIC BLOOD PRESSURE: 83 MMHG | WEIGHT: 199 LBS | BODY MASS INDEX: 35 KG/M2 | SYSTOLIC BLOOD PRESSURE: 129 MMHG

## 2019-11-20 DIAGNOSIS — D25.1 INTRAMURAL AND SUBMUCOUS LEIOMYOMA OF UTERUS: Primary | ICD-10-CM

## 2019-11-20 DIAGNOSIS — N92.1 MENORRHAGIA WITH IRREGULAR CYCLE: ICD-10-CM

## 2019-11-20 DIAGNOSIS — N84.0 ENDOMETRIAL POLYP: ICD-10-CM

## 2019-11-20 DIAGNOSIS — D25.0 INTRAMURAL AND SUBMUCOUS LEIOMYOMA OF UTERUS: Primary | ICD-10-CM

## 2019-11-20 PROCEDURE — 99213 OFFICE O/P EST LOW 20 MIN: CPT | Performed by: OBSTETRICS & GYNECOLOGY

## 2019-11-20 NOTE — TELEPHONE ENCOUNTER
Please schedule on 12/9/19 tentatively for hysteroscopy/D&C/ myosure polypectomy, possible resection of submucosal fibroid. I see that I have a case at 9:30. Please add her at 7:30 am if possible. She is trying to get a date before her deductible runs out by the end of the year.   thanks

## 2019-11-20 NOTE — PROGRESS NOTES
[address] [place] [address]    8/10/1970       Patient presents with:  Consult: Pt in to discuss her results from her ct. Patient here to discuss management plan after having had an Endo see procedure a few days ago.   She has the results of her MRI with her on a d normocephalic  Psychiatric:  Oriented to time, place, person and situation. Appropriate mood and affect        Garrett Masterson was seen today for consult.     Diagnoses and all orders for this visit:    Intramural and submucous leiomyoma of uterus    Menorrhagia with

## 2019-11-21 NOTE — TELEPHONE ENCOUNTER
Just to clarify, does not matter if she is in day 7-10 of cycle as long as she is not bleeding? Surgery is scheduled for 12-9-19 at 7:30am.      The Camille spoke with Alfa Davis. Was told no prior auth needed for CPT; 04746 or 55571. Call ref #[id number].

## 2019-11-21 NOTE — TELEPHONE ENCOUNTER
No we just tentatively scheduled it for 12/9/19 because she is projecting that is when she should be day 7-10. She understands that it may need to be rescheduled if not in the right window.   It does still need to be day 7-10

## 2019-11-22 ENCOUNTER — HOSPITAL ENCOUNTER (OUTPATIENT)
Dept: MRI IMAGING | Facility: HOSPITAL | Age: 49
Discharge: HOME | End: 2019-11-22
Attending: INTERNAL MEDICINE
Payer: COMMERCIAL

## 2019-11-22 ENCOUNTER — TELEPHONE (OUTPATIENT)
Dept: OBGYN CLINIC | Facility: CLINIC | Age: 49
End: 2019-11-22

## 2019-11-22 DIAGNOSIS — K76.9 LIVER LESION, RIGHT LOBE: ICD-10-CM

## 2019-11-22 PROCEDURE — 74183 MRI ABD W/O CNTR FLWD CNTR: CPT | Performed by: INTERNAL MEDICINE

## 2019-11-22 PROCEDURE — A9575 INJ GADOTERATE MEGLUMI 0.1ML: HCPCS | Performed by: INTERNAL MEDICINE

## 2019-11-22 NOTE — TELEPHONE ENCOUNTER
PT IS AWARE OF SURGERY DATE AND TIME AND THAT INSTRUCTIONS WERE ROUTED TO HER VIA MY CHART. STATES SHE ALREADY REVIEWED THEM. PT WAS ASKING IF WE COULD ALSO SCHEDULE SURGERY FOR THE FOLLOWING WEEK MON IN CASE HER PERIOD COMES LATE BUT ADVISED WE ARE UNABLE TO DO THAT. PT WILL SHOOT US A MESSAGE THROUGH MY CHART WHEN PERIOD BEGINS TO CONFIRM WE ARE IN THE RIGHT TIME FRAME.

## 2019-11-22 NOTE — TELEPHONE ENCOUNTER
Death Summary





- Providers


Date of service: 03/14/18


Consults: 


 





03/11/18 15:12


Consult to Dietitian/Nutrition [CONS] Routine 


   Physician Instructions: 


   Reason For Exam: 


   Reason for Consult: Write/Manage Tube Feeding





03/11/18 16:48


Consult to Physician [CONS] Routine 


   Consulting Provider: REGGIE HEBERT


   Reason For Exam: resp failure


   Place consult to:: CC INTENSIVIST


   Notified:: Y


   If yes, spoke with:: DR SALAZAR


   Time called:: 17:00





03/11/18 23:31


Consult to Cardiology [CONS] Routine 


   Consulting Provider: JOSHUA CARO


   Reason For Exam: elevated troponin





03/12/18 09:10


Consult to Physician [CONS] Routine 


   Consulting Provider: ZOË BOURNE


   Reason For Exam: anoxic brain injury/brain death ?


   Place consult to:: on call neurology


   Notified:: Alisha Marie ext. [phone]


   Phone number called:: [phone]











Attending: 


SHEY SUAREZ MD








- Death summary


Date of admission: 


03/11/18 16:47





Date of Death: 03/14/18


Significant findings: 


Patient is a 61-year-old female past medical history of asthma COPD and 

hypertension who is presenting cardiac arrest.  Patient's family called [phone] 

because of diaphoresis and shortness of breath.  When EMS arrived they 

initiated ACLS protocol.  The patient was in [place] most of the ride to the 

emergency department. Patient unfortuanatel was not able to recover, imaging 

studies of the brain revealed Anoxic brain injury with brain flow scan noting 

no blood flow. After discussion with the family the patient was terminally 

extubated.








 Anoxic brain damage with documented Brain death


 Cardiaopulmonary arrest


Cardiogenic shock


Aspiration pneumonia


Acute Respiratory failure with hypoxia: 


Metabolic acidosis


Type 2 MI


hypernatremia,


OPAL, likely tubular necrosis from hypotension








Pertinent studies: 


Brain flow scan- no blood flow in brain Message to surgery coordinator

## 2019-11-26 ENCOUNTER — TELEPHONE (OUTPATIENT)
Dept: GASTROENTEROLOGY | Facility: CLINIC | Age: 49
End: 2019-11-26

## 2019-11-26 NOTE — TELEPHONE ENCOUNTER
Entered into [facility]: Recall MRI Liver in 6 months (with eovist contrast) per Dr. Aleena Harvey. Last done 11/22/2019, next due 5/22/20.

## 2019-11-26 NOTE — TELEPHONE ENCOUNTER
Left detailed VM for patient re: MRI showing both as below. Some indeterminate lesions that need MRI liver WITH EOVIST in 6 months. Asked her to avoid any hormone therapy. GI Clinical Staff:   Recall MRI in 6 months (with eovist contrast).       -----

## 2019-12-03 ENCOUNTER — OFFICE VISIT (OUTPATIENT)
Dept: PODIATRY CLINIC | Facility: CLINIC | Age: 49
End: 2019-12-03
Payer: COMMERCIAL

## 2019-12-03 DIAGNOSIS — B35.1 ONYCHOMYCOSIS: Primary | ICD-10-CM

## 2019-12-03 PROCEDURE — 99203 OFFICE O/P NEW LOW 30 MIN: CPT | Performed by: PODIATRIST

## 2019-12-03 NOTE — PROGRESS NOTES
Dagoberto Kate is a 47year old female. Patient presents with:  Consult: bilateral hallux nails are cracking low down on the nail. right hallux nail has a dark line running vertically through nail. pt states she gets pedicures every 2 weeks.          HPI: Alcohol use: Yes        Frequency: Never      Drug use: Never      Sexual activity: Not Currently        Partners: Male          REVIEW OF SYSTEMS:   Review of Systems  Today reviewed systens as documented below  GENERAL HEALTH: feels well otherwise  SKI

## 2019-12-09 ENCOUNTER — HOSPITAL ENCOUNTER (OUTPATIENT)
Facility: HOSPITAL | Age: 49
Setting detail: HOSPITAL OUTPATIENT SURGERY
Discharge: HOME | End: 2019-12-09
Attending: OBSTETRICS & GYNECOLOGY | Admitting: OBSTETRICS & GYNECOLOGY
Payer: COMMERCIAL

## 2019-12-09 ENCOUNTER — ANESTHESIA (OUTPATIENT)
Dept: SURGERY | Facility: HOSPITAL | Age: 49
End: 2019-12-09
Payer: COMMERCIAL

## 2019-12-09 ENCOUNTER — ANESTHESIA EVENT (OUTPATIENT)
Dept: SURGERY | Facility: HOSPITAL | Age: 49
End: 2019-12-09
Payer: COMMERCIAL

## 2019-12-09 VITALS
DIASTOLIC BLOOD PRESSURE: 67 MMHG | WEIGHT: 199 LBS | OXYGEN SATURATION: 99 % | HEIGHT: 63 IN | TEMPERATURE: 98 F | RESPIRATION RATE: 15 BRPM | SYSTOLIC BLOOD PRESSURE: 122 MMHG | BODY MASS INDEX: 35.26 KG/M2 | HEART RATE: 56 BPM

## 2019-12-09 DIAGNOSIS — N94.89 ENDOMETRIAL MASS: ICD-10-CM

## 2019-12-09 DIAGNOSIS — N92.0 MENORRHAGIA WITH REGULAR CYCLE: ICD-10-CM

## 2019-12-09 PROCEDURE — 0UB98ZZ EXCISION OF UTERUS, VIA NATURAL OR ARTIFICIAL OPENING ENDOSCOPIC: ICD-10-PCS | Performed by: OBSTETRICS & GYNECOLOGY

## 2019-12-09 PROCEDURE — 58558 HYSTEROSCOPY BIOPSY: CPT | Performed by: OBSTETRICS & GYNECOLOGY

## 2019-12-09 RX ORDER — HYDROMORPHONE HYDROCHLORIDE 1 MG/ML
0.2 INJECTION, SOLUTION INTRAMUSCULAR; INTRAVENOUS; SUBCUTANEOUS EVERY 5 MIN PRN
Status: DISCONTINUED | OUTPATIENT
Start: 2019-12-09 | End: 2019-12-09

## 2019-12-09 RX ORDER — ACETAMINOPHEN 500 MG
1000 TABLET ORAL ONCE
Status: COMPLETED | OUTPATIENT
Start: 2019-12-09 | End: 2019-12-09

## 2019-12-09 RX ORDER — MORPHINE SULFATE 10 MG/ML
6 INJECTION, SOLUTION INTRAMUSCULAR; INTRAVENOUS EVERY 10 MIN PRN
Status: DISCONTINUED | OUTPATIENT
Start: 2019-12-09 | End: 2019-12-09

## 2019-12-09 RX ORDER — ONDANSETRON 2 MG/ML
4 INJECTION INTRAMUSCULAR; INTRAVENOUS EVERY 8 HOURS PRN
Status: CANCELLED | OUTPATIENT
Start: 2019-12-09

## 2019-12-09 RX ORDER — EPHEDRINE SULFATE 50 MG/ML
INJECTION, SOLUTION INTRAVENOUS AS NEEDED
Status: DISCONTINUED | OUTPATIENT
Start: 2019-12-09 | End: 2019-12-09 | Stop reason: SURG

## 2019-12-09 RX ORDER — HYDROMORPHONE HYDROCHLORIDE 1 MG/ML
0.6 INJECTION, SOLUTION INTRAMUSCULAR; INTRAVENOUS; SUBCUTANEOUS EVERY 5 MIN PRN
Status: DISCONTINUED | OUTPATIENT
Start: 2019-12-09 | End: 2019-12-09

## 2019-12-09 RX ORDER — HYDROCODONE BITARTRATE AND ACETAMINOPHEN 5; 325 MG/1; MG/1
2 TABLET ORAL AS NEEDED
Status: DISCONTINUED | OUTPATIENT
Start: 2019-12-09 | End: 2019-12-09

## 2019-12-09 RX ORDER — IBUPROFEN 600 MG/1
600 TABLET ORAL EVERY 6 HOURS
Status: CANCELLED | OUTPATIENT
Start: 2019-12-09

## 2019-12-09 RX ORDER — FAMOTIDINE 20 MG/1
20 TABLET ORAL ONCE
Status: COMPLETED | OUTPATIENT
Start: 2019-12-09 | End: 2019-12-09

## 2019-12-09 RX ORDER — SODIUM CHLORIDE, SODIUM LACTATE, POTASSIUM CHLORIDE, CALCIUM CHLORIDE 600; 310; 30; 20 MG/100ML; MG/100ML; MG/100ML; MG/100ML
INJECTION, SOLUTION INTRAVENOUS CONTINUOUS
Status: DISCONTINUED | OUTPATIENT
Start: 2019-12-09 | End: 2019-12-09

## 2019-12-09 RX ORDER — LIDOCAINE HYDROCHLORIDE 10 MG/ML
INJECTION, SOLUTION EPIDURAL; INFILTRATION; INTRACAUDAL; PERINEURAL AS NEEDED
Status: DISCONTINUED | OUTPATIENT
Start: 2019-12-09 | End: 2019-12-09 | Stop reason: SURG

## 2019-12-09 RX ORDER — GLYCOPYRROLATE 0.2 MG/ML
INJECTION, SOLUTION INTRAMUSCULAR; INTRAVENOUS AS NEEDED
Status: DISCONTINUED | OUTPATIENT
Start: 2019-12-09 | End: 2019-12-09 | Stop reason: SURG

## 2019-12-09 RX ORDER — ONDANSETRON 2 MG/ML
INJECTION INTRAMUSCULAR; INTRAVENOUS AS NEEDED
Status: DISCONTINUED | OUTPATIENT
Start: 2019-12-09 | End: 2019-12-09 | Stop reason: SURG

## 2019-12-09 RX ORDER — HYDROMORPHONE HYDROCHLORIDE 1 MG/ML
0.4 INJECTION, SOLUTION INTRAMUSCULAR; INTRAVENOUS; SUBCUTANEOUS EVERY 5 MIN PRN
Status: DISCONTINUED | OUTPATIENT
Start: 2019-12-09 | End: 2019-12-09

## 2019-12-09 RX ORDER — HYDROCODONE BITARTRATE AND ACETAMINOPHEN 5; 325 MG/1; MG/1
1 TABLET ORAL AS NEEDED
Status: DISCONTINUED | OUTPATIENT
Start: 2019-12-09 | End: 2019-12-09

## 2019-12-09 RX ORDER — MORPHINE SULFATE 4 MG/ML
4 INJECTION, SOLUTION INTRAMUSCULAR; INTRAVENOUS EVERY 10 MIN PRN
Status: DISCONTINUED | OUTPATIENT
Start: 2019-12-09 | End: 2019-12-09

## 2019-12-09 RX ORDER — METOCLOPRAMIDE 10 MG/1
10 TABLET ORAL ONCE
Status: COMPLETED | OUTPATIENT
Start: 2019-12-09 | End: 2019-12-09

## 2019-12-09 RX ORDER — NALOXONE HYDROCHLORIDE 0.4 MG/ML
80 INJECTION, SOLUTION INTRAMUSCULAR; INTRAVENOUS; SUBCUTANEOUS AS NEEDED
Status: DISCONTINUED | OUTPATIENT
Start: 2019-12-09 | End: 2019-12-09

## 2019-12-09 RX ORDER — MORPHINE SULFATE 4 MG/ML
2 INJECTION, SOLUTION INTRAMUSCULAR; INTRAVENOUS EVERY 10 MIN PRN
Status: DISCONTINUED | OUTPATIENT
Start: 2019-12-09 | End: 2019-12-09

## 2019-12-09 RX ORDER — ONDANSETRON 2 MG/ML
4 INJECTION INTRAMUSCULAR; INTRAVENOUS ONCE AS NEEDED
Status: DISCONTINUED | OUTPATIENT
Start: 2019-12-09 | End: 2019-12-09

## 2019-12-09 RX ORDER — HALOPERIDOL 5 MG/ML
0.25 INJECTION INTRAMUSCULAR ONCE AS NEEDED
Status: DISCONTINUED | OUTPATIENT
Start: 2019-12-09 | End: 2019-12-09

## 2019-12-09 RX ORDER — DEXAMETHASONE SODIUM PHOSPHATE 4 MG/ML
VIAL (ML) INJECTION AS NEEDED
Status: DISCONTINUED | OUTPATIENT
Start: 2019-12-09 | End: 2019-12-09 | Stop reason: SURG

## 2019-12-09 RX ORDER — PROCHLORPERAZINE EDISYLATE 5 MG/ML
5 INJECTION INTRAMUSCULAR; INTRAVENOUS ONCE AS NEEDED
Status: DISCONTINUED | OUTPATIENT
Start: 2019-12-09 | End: 2019-12-09

## 2019-12-09 RX ORDER — ONDANSETRON 4 MG/1
4 TABLET, FILM COATED ORAL EVERY 8 HOURS PRN
Status: CANCELLED | OUTPATIENT
Start: 2019-12-09

## 2019-12-09 RX ORDER — MIDAZOLAM HYDROCHLORIDE 1 MG/ML
INJECTION INTRAMUSCULAR; INTRAVENOUS AS NEEDED
Status: DISCONTINUED | OUTPATIENT
Start: 2019-12-09 | End: 2019-12-09 | Stop reason: SURG

## 2019-12-09 RX ORDER — KETOROLAC TROMETHAMINE 30 MG/ML
INJECTION, SOLUTION INTRAMUSCULAR; INTRAVENOUS AS NEEDED
Status: DISCONTINUED | OUTPATIENT
Start: 2019-12-09 | End: 2019-12-09 | Stop reason: SURG

## 2019-12-09 RX ADMIN — SODIUM CHLORIDE, SODIUM LACTATE, POTASSIUM CHLORIDE, CALCIUM CHLORIDE: 600; 310; 30; 20 INJECTION, SOLUTION INTRAVENOUS at 08:39:00

## 2019-12-09 RX ADMIN — EPHEDRINE SULFATE 5 MG: 50 INJECTION, SOLUTION INTRAVENOUS at 08:18:00

## 2019-12-09 RX ADMIN — KETOROLAC TROMETHAMINE 30 MG: 30 INJECTION, SOLUTION INTRAMUSCULAR; INTRAVENOUS at 08:30:00

## 2019-12-09 RX ADMIN — MIDAZOLAM HYDROCHLORIDE 2 MG: 1 INJECTION INTRAMUSCULAR; INTRAVENOUS at 07:43:00

## 2019-12-09 RX ADMIN — DEXAMETHASONE SODIUM PHOSPHATE 4 MG: 4 MG/ML VIAL (ML) INJECTION at 07:45:00

## 2019-12-09 RX ADMIN — EPHEDRINE SULFATE 5 MG: 50 INJECTION, SOLUTION INTRAVENOUS at 08:10:00

## 2019-12-09 RX ADMIN — GLYCOPYRROLATE 0.2 MG: 0.2 INJECTION, SOLUTION INTRAMUSCULAR; INTRAVENOUS at 07:45:00

## 2019-12-09 RX ADMIN — ONDANSETRON 4 MG: 2 INJECTION INTRAMUSCULAR; INTRAVENOUS at 07:45:00

## 2019-12-09 RX ADMIN — LIDOCAINE HYDROCHLORIDE 25 MG: 10 INJECTION, SOLUTION EPIDURAL; INFILTRATION; INTRACAUDAL; PERINEURAL at 07:45:00

## 2019-12-09 NOTE — ANESTHESIA PREPROCEDURE EVALUATION
Anesthesia PreOp Note    HPI:     Zacarias Romero is a 47year old female who presents for preoperative consultation requested by: Juan Chaves MD    Date of Surgery: 12/9/2019    Procedure(s):   MYOMECTOMY HYSTEROSCOPIC  Indication: Menorrhagia with regu age unknown   • Breast Cancer Maternal Aunt         age unknown   • Breast Cancer Maternal Cousin Female         age unknown   • Breast Cancer Paternal Cousin Female         age unknown   • Cancer Maternal Aunt         pancreatic/age unknown   • Other Lab Results   Component Value Date    WBC 7.7 09/30/2019    RBC 4.11 09/30/2019    HGB 12.0 09/30/2019    HCT 38.6 09/30/2019    MCV 93.9 09/30/2019    MCH 29.2 09/30/2019    MCHC 31.1 09/30/2019    RDW 16.5 (H) 09/30/2019    .0 09/30/2019

## 2019-12-09 NOTE — INTERVAL H&P NOTE
Pre-op Diagnosis: Menorrhagia with regular cycle [N92.0]  Endometrial mass [N94.89]    The above referenced H&P was reviewed by Ayesha Feng.  MD Naina on 12/9/2019, the patient was examined and no significant changes have occurred in the patient's condition s

## 2019-12-09 NOTE — H&P
Alli Avalos Patient Status:  Hospital Outpatient Surgery    8/10/1970 MRN [id number]   Location [place] [address] Attending Page, Lisa Busch MD   Hosp Day # 0 PCP Geneva Mcgarry, 12/05/2019.      General appearance:  alert, appears stated age and cooperative  Abdominal: soft, non-tender; bowel sounds normal; no masses,  no organomegaly  Pelvic: deferred for EUA  Extremities: extremities normal, atraumatic, no cyanosis or edema  Psyc

## 2019-12-09 NOTE — ANESTHESIA PROCEDURE NOTES
Airway  Date/Time: 12/9/2019 7:47 AM  Urgency: Elective      General Information and Staff    Patient location during procedure: OR  Anesthesiologist: Christi Robles DO  Resident/CRNA: Verneta Paget, CRNA    Indications and Patient Condition  Indic

## 2019-12-09 NOTE — DISCHARGE SUMMARY
[facility]    Discharge Summary    Ángela Journey Patient Status:  Hospital Outpatient Surgery    8/10/1970 MRN [id number]   Location [facility] Attending No att. providers found   Hosp Day # 0 PCP Leighton Lechuga night home   · Do not drink alcohol or take sleeping pills or tranquilizers   · Do not sign legal documents within 24 hours of your procedure   · If you had a nerve block for your surgery, take extra care not to put any pressure on your arm or hand for 24 (38.0°C) or higher, or as directed by your provider  · Increasing belly (abdominal) pain or soreness  · Bad-smelling discharge                 Ori Cornejo.  Page  12/9/2019

## 2019-12-09 NOTE — ANESTHESIA POSTPROCEDURE EVALUATION
Patient: Tami Taylor    Procedure Summary     Date:  12/09/19 Room / Location:  [address] [place] [address] MAIN OR 02 / [address] [place] [address] MAIN OR    Anesthesia Start:  9196 Anesthesia Stop:      Procedure:  MYOMECTOMY HYSTEROSCOPIC (N/A Vagina ) Diagnosis:       Menorrhagia with regular cyc

## 2019-12-09 NOTE — OPERATIVE REPORT
[facility] PRE OP RECOVERY  Operative Note     Rustam Schofield Location: OR   [facility] [id number] MRN [id number]   Admission Date 12/9/2019 Operation Date 12/9/2019   Attending Physician No att. providers found Operating Physician MD Damien Holt

## 2019-12-11 ENCOUNTER — TELEPHONE (OUTPATIENT)
Dept: OBGYN CLINIC | Facility: CLINIC | Age: 49
End: 2019-12-11

## 2019-12-11 NOTE — TELEPHONE ENCOUNTER
Pt needs a post op f.u in 2 weeks with CAP   No availability     Work:[phone]  Call this number first per pt  Cell: [phone]

## 2019-12-18 ENCOUNTER — PATIENT MESSAGE (OUTPATIENT)
Dept: PODIATRY CLINIC | Facility: CLINIC | Age: 49
End: 2019-12-18

## 2019-12-18 NOTE — TELEPHONE ENCOUNTER
From: Ketty Munoz  To: Harman Cervantes DPM  Sent: 12/18/2019 8:24 AM CST  Subject: Test Results Question    Good morning. I never received the results from the culture taken from my right big toe nail. Please advise.

## 2019-12-24 ENCOUNTER — OFFICE VISIT (OUTPATIENT)
Dept: OBGYN CLINIC | Facility: CLINIC | Age: 49
End: 2019-12-24
Payer: COMMERCIAL

## 2019-12-24 VITALS
WEIGHT: 196.38 LBS | SYSTOLIC BLOOD PRESSURE: 108 MMHG | HEART RATE: 92 BPM | BODY MASS INDEX: 35 KG/M2 | DIASTOLIC BLOOD PRESSURE: 71 MMHG

## 2019-12-24 DIAGNOSIS — Z09 POSTOP CHECK: ICD-10-CM

## 2019-12-24 DIAGNOSIS — D25.1 INTRAMURAL AND SUBMUCOUS LEIOMYOMA OF UTERUS: Primary | ICD-10-CM

## 2019-12-24 DIAGNOSIS — N92.1 MENORRHAGIA WITH IRREGULAR CYCLE: ICD-10-CM

## 2019-12-24 DIAGNOSIS — D25.0 INTRAMURAL AND SUBMUCOUS LEIOMYOMA OF UTERUS: Primary | ICD-10-CM

## 2019-12-24 DIAGNOSIS — Z98.890 STATUS POST HYSTEROSCOPIC MYOMECTOMY: ICD-10-CM

## 2019-12-24 PROCEDURE — 99213 OFFICE O/P EST LOW 20 MIN: CPT | Performed by: OBSTETRICS & GYNECOLOGY

## 2019-12-24 NOTE — PROGRESS NOTES
Jamison Choudhury is a 47year old female [id number] Patient's last menstrual period was 12/02/2019. Patient presents with:  Gyn Exam: Post-op f/u   . Had hysteroscopic myomectomy performed on 12/9/2019. Doing well. Path was benign fibroid.     OBSTETRICS HISTO connections:        Talks on phone: Not on file        Gets together: Not on file        Attends Religion service: Not on file        Active member of club or organization: Not on file        Attends meetings of clubs or organizations: Not on file size, location, without lesions and prolapse  Bladder:  No fullness, masses or tenderness  Vagina:  Normal appearance without lesions, no abnormal discharge  Cervix:  Normal without tenderness on motion  Uterus: normal in size, contour, position, mobility,

## 2019-12-26 ENCOUNTER — TELEPHONE (OUTPATIENT)
Dept: ORTHOPEDICS CLINIC | Facility: CLINIC | Age: 49
End: 2019-12-26

## 2019-12-26 DIAGNOSIS — B35.1 ONYCHOMYCOSIS: Primary | ICD-10-CM

## 2019-12-26 NOTE — TELEPHONE ENCOUNTER
----- Message from Marty Jones DPM sent at 12/19/2019  3:01 PM CST -----  Results reviewed. Please inform patient that fungal culture results are positive and we should proceed with Lamisil tablet therapy.   If the patient agrees we have to do a hep

## 2020-02-28 ENCOUNTER — PATIENT MESSAGE (OUTPATIENT)
Dept: OBGYN CLINIC | Facility: CLINIC | Age: 50
End: 2020-02-28

## 2020-02-28 NOTE — TELEPHONE ENCOUNTER
From: [facility]  To: Seth Chew MD  Sent: 2/28/2020 3:25 PM CST  Subject: Other    Good afternoon! Just wanted Dr. Zafar Moore to know I've had 3 menstrual cycles since my hysteroscopy in December (Dec, Young & I just completed February's) OMG!!!  Than

## 2020-03-02 NOTE — TELEPHONE ENCOUNTER
Please let pt know that I am so glad that she is feeling better and I appreciate her email.   Also agree that our prenatal patients are shared and unfortunately cannot see one doctor but I look forward to meeting the patient that she referred- I appreciate

## 2020-03-18 ENCOUNTER — PATIENT MESSAGE (OUTPATIENT)
Dept: FAMILY MEDICINE CLINIC | Facility: CLINIC | Age: 50
End: 2020-03-18

## 2020-03-18 ENCOUNTER — NURSE TRIAGE (OUTPATIENT)
Dept: FAMILY MEDICINE CLINIC | Facility: CLINIC | Age: 50
End: 2020-03-18

## 2020-03-18 NOTE — TELEPHONE ENCOUNTER
Action Requested: Summary for Provider     []  Critical Lab, Recommendations Needed  [x] Need Additional Advice  []   FYI    []   Need Orders  [] Need Medications Sent to Pharmacy  []  Other     SUMMARY: Travel screen completed:   intermittent non-producti

## 2020-03-18 NOTE — TELEPHONE ENCOUNTER
From: Jose Daniel Barcenas  To: Jose Manuel Gill DO  Sent: 3/18/2020 8:15 AM CDT  Subject: Other    Good morning Dr. 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     [facility] [employer]

## 2020-03-18 NOTE — TELEPHONE ENCOUNTER
I spoke with the patient and advised her that she does not fit criteria for testing. I explained to her that when I do an in office testing.   I also cautioned her that that if she goes to a facility that is set up for testing she sets herself up for high

## 2020-05-07 ENCOUNTER — TELEPHONE (OUTPATIENT)
Dept: FAMILY MEDICINE CLINIC | Facility: CLINIC | Age: 50
End: 2020-05-07

## 2020-05-07 DIAGNOSIS — Z12.31 BREAST CANCER SCREENING BY MAMMOGRAM: Primary | ICD-10-CM

## 2020-05-07 NOTE — TELEPHONE ENCOUNTER
Dr. Lance Hills, patient is requesting a mammogram order. Last mammogram was completed 07/02/2019. Please advise on order.      CONCLUSION:       BI-RADS CATEGORY:     DIAGNOSTIC CATEGORY 2--BENIGN FINDING:       RECOMMENDATIONS:   ROUTINE MAMMOGRAM AND CLINIC

## 2020-05-11 ENCOUNTER — TELEPHONE (OUTPATIENT)
Dept: GASTROENTEROLOGY | Facility: CLINIC | Age: 50
End: 2020-05-11

## 2020-05-11 DIAGNOSIS — K76.9 LIVER LESION: Primary | ICD-10-CM

## 2020-05-11 NOTE — TELEPHONE ENCOUNTER
----- Message from Mike Ca RN sent at 2019 10:12 AM CST -----  Regardin month MRI liver recall  Entered into Epic: Recall MRI Liver in 6 months (with eovist contrast) per Dr. Amy Hatfield. Last done 2019, next due 20.

## 2020-05-21 ENCOUNTER — PATIENT MESSAGE (OUTPATIENT)
Dept: GASTROENTEROLOGY | Facility: CLINIC | Age: 50
End: 2020-05-21

## 2020-05-21 NOTE — TELEPHONE ENCOUNTER
From: Galilea Haas  To: Amy Meyers MD  Sent: 5/21/2020 1:38 PM CDT  Subject: Other    Good afternoon! I just scheduled my follow up MRI for Friday, July 17th at the [facility] location.  Please submit an authorization to my insurance for pr

## 2020-05-22 NOTE — TELEPHONE ENCOUNTER
Pt has been scheduled:  Future Appointments   Date Time Provider Ajit Park   7/17/2020  1:45 PM [facility] MRI RM1 (1.5T) [facility] MRI EM [facility] to obtain PA for DOS and inform the pt of the authorization.

## 2020-07-17 ENCOUNTER — HOSPITAL ENCOUNTER (OUTPATIENT)
Dept: MRI IMAGING | Facility: HOSPITAL | Age: 50
Discharge: HOME | End: 2020-07-17
Attending: INTERNAL MEDICINE
Payer: COMMERCIAL

## 2020-07-17 ENCOUNTER — OFFICE VISIT (OUTPATIENT)
Dept: OBGYN CLINIC | Facility: CLINIC | Age: 50
End: 2020-07-17
Payer: COMMERCIAL

## 2020-07-17 ENCOUNTER — HOSPITAL ENCOUNTER (OUTPATIENT)
Dept: MAMMOGRAPHY | Facility: HOSPITAL | Age: 50
Discharge: HOME | End: 2020-07-17
Attending: FAMILY MEDICINE
Payer: COMMERCIAL

## 2020-07-17 ENCOUNTER — OFFICE VISIT (OUTPATIENT)
Dept: FAMILY MEDICINE CLINIC | Facility: CLINIC | Age: 50
End: 2020-07-17
Payer: COMMERCIAL

## 2020-07-17 ENCOUNTER — PATIENT MESSAGE (OUTPATIENT)
Dept: FAMILY MEDICINE CLINIC | Facility: CLINIC | Age: 50
End: 2020-07-17

## 2020-07-17 VITALS
HEART RATE: 98 BPM | DIASTOLIC BLOOD PRESSURE: 80 MMHG | WEIGHT: 236 LBS | HEIGHT: 63 IN | RESPIRATION RATE: 17 BRPM | SYSTOLIC BLOOD PRESSURE: 141 MMHG | BODY MASS INDEX: 41.82 KG/M2 | TEMPERATURE: 98 F

## 2020-07-17 VITALS
BODY MASS INDEX: 41 KG/M2 | WEIGHT: 234.19 LBS | HEART RATE: 73 BPM | DIASTOLIC BLOOD PRESSURE: 86 MMHG | SYSTOLIC BLOOD PRESSURE: 136 MMHG

## 2020-07-17 DIAGNOSIS — Z86.018 HISTORY OF UTERINE FIBROID: ICD-10-CM

## 2020-07-17 DIAGNOSIS — Z00.00 ENCOUNTER FOR PREVENTIVE CARE: Primary | ICD-10-CM

## 2020-07-17 DIAGNOSIS — G47.33 OSA ON CPAP: ICD-10-CM

## 2020-07-17 DIAGNOSIS — Z01.419 ENCOUNTER FOR GYNECOLOGICAL EXAMINATION WITHOUT ABNORMAL FINDING: Primary | ICD-10-CM

## 2020-07-17 DIAGNOSIS — E66.01 MORBID OBESITY WITH BMI OF 40.0-44.9, ADULT (HCC): ICD-10-CM

## 2020-07-17 DIAGNOSIS — Z20.822 CLOSE EXPOSURE TO COVID-19 VIRUS: ICD-10-CM

## 2020-07-17 DIAGNOSIS — K76.9 LIVER LESION: ICD-10-CM

## 2020-07-17 DIAGNOSIS — Z99.89 OSA ON CPAP: ICD-10-CM

## 2020-07-17 DIAGNOSIS — Z12.31 BREAST CANCER SCREENING BY MAMMOGRAM: ICD-10-CM

## 2020-07-17 DIAGNOSIS — K76.89 FOCAL NODULAR HYPERPLASIA OF LIVER: ICD-10-CM

## 2020-07-17 DIAGNOSIS — D50.9 IRON DEFICIENCY ANEMIA, UNSPECIFIED IRON DEFICIENCY ANEMIA TYPE: ICD-10-CM

## 2020-07-17 DIAGNOSIS — N89.8 VAGINAL ODOR: ICD-10-CM

## 2020-07-17 LAB
ALBUMIN SERPL-MCNC: 3.1 G/DL (ref 3.4–5)
ALBUMIN/GLOB SERPL: 0.7 {RATIO} (ref 1–2)
ALP LIVER SERPL-CCNC: 65 U/L (ref 39–100)
ALT SERPL-CCNC: 20 U/L (ref 13–56)
ANION GAP SERPL CALC-SCNC: 2 MMOL/L (ref 0–18)
AST SERPL-CCNC: 16 U/L (ref 15–37)
BACTERIA UR QL AUTO: NEGATIVE /HPF
BASOPHILS # BLD AUTO: 0.01 X10(3) UL (ref 0–0.2)
BASOPHILS NFR BLD AUTO: 0.2 %
BILIRUB SERPL-MCNC: 0.2 MG/DL (ref 0.1–2)
BILIRUB UR QL: NEGATIVE
BUN BLD-MCNC: 15 MG/DL (ref 7–18)
BUN/CREAT SERPL: 20.5 (ref 10–20)
CALCIUM BLD-MCNC: 8.6 MG/DL (ref 8.5–10.1)
CHLORIDE SERPL-SCNC: 108 MMOL/L (ref 98–112)
CHOLEST SMN-MCNC: 188 MG/DL (ref ?–200)
CLARITY UR: CLEAR
CO2 SERPL-SCNC: 29 MMOL/L (ref 21–32)
COLOR UR: YELLOW
CREAT BLD-MCNC: 0.73 MG/DL (ref 0.55–1.02)
DEPRECATED RDW RBC AUTO: 55.1 FL (ref 35.1–46.3)
EOSINOPHIL # BLD AUTO: 0.05 X10(3) UL (ref 0–0.7)
EOSINOPHIL NFR BLD AUTO: 1 %
ERYTHROCYTE [DISTWIDTH] IN BLOOD BY AUTOMATED COUNT: 16.4 % (ref 11–15)
GLOBULIN PLAS-MCNC: 4.6 G/DL (ref 2.8–4.4)
GLUCOSE BLD-MCNC: 76 MG/DL (ref 70–99)
GLUCOSE UR-MCNC: NEGATIVE MG/DL
HCT VFR BLD AUTO: 41.5 % (ref 35–48)
HDLC SERPL-MCNC: 47 MG/DL (ref 40–59)
HGB BLD-MCNC: 12.3 G/DL (ref 12–16)
HGB UR QL STRIP.AUTO: NEGATIVE
IMM GRANULOCYTES # BLD AUTO: 0.01 X10(3) UL (ref 0–1)
IMM GRANULOCYTES NFR BLD: 0.2 %
KETONES UR-MCNC: NEGATIVE MG/DL
LDLC SERPL CALC-MCNC: 119 MG/DL (ref ?–100)
LEUKOCYTE ESTERASE UR QL STRIP.AUTO: NEGATIVE
LYMPHOCYTES # BLD AUTO: 1.53 X10(3) UL (ref 1–4)
LYMPHOCYTES NFR BLD AUTO: 30.5 %
M PROTEIN MFR SERPL ELPH: 7.7 G/DL (ref 6.4–8.2)
MCH RBC QN AUTO: 29.4 PG (ref 26–34)
MCHC RBC AUTO-ENTMCNC: 29.6 G/DL (ref 31–37)
MCV RBC AUTO: 99 FL (ref 80–100)
MONOCYTES # BLD AUTO: 0.46 X10(3) UL (ref 0.1–1)
MONOCYTES NFR BLD AUTO: 9.2 %
NEUTROPHILS # BLD AUTO: 2.96 X10 (3) UL (ref 1.5–7.7)
NEUTROPHILS # BLD AUTO: 2.96 X10(3) UL (ref 1.5–7.7)
NEUTROPHILS NFR BLD AUTO: 58.9 %
NITRITE UR QL STRIP.AUTO: NEGATIVE
NONHDLC SERPL-MCNC: 141 MG/DL (ref ?–130)
OSMOLALITY SERPL CALC.SUM OF ELEC: 288 MOSM/KG (ref 275–295)
PATIENT FASTING Y/N/NP: YES
PATIENT FASTING Y/N/NP: YES
PH UR: 6 [PH] (ref 5–8)
PLATELET # BLD AUTO: 392 10(3)UL (ref 150–450)
POTASSIUM SERPL-SCNC: 4.8 MMOL/L (ref 3.5–5.1)
PROT UR-MCNC: 30 MG/DL
RBC # BLD AUTO: 4.19 X10(6)UL (ref 3.8–5.3)
RBC #/AREA URNS AUTO: 1 /HPF
SODIUM SERPL-SCNC: 139 MMOL/L (ref 136–145)
SP GR UR STRIP: 1.02 (ref 1–1.03)
TRIGL SERPL-MCNC: 112 MG/DL (ref 30–149)
TSI SER-ACNC: 0.99 MIU/ML (ref 0.36–3.74)
UROBILINOGEN UR STRIP-ACNC: <2
VLDLC SERPL CALC-MCNC: 22 MG/DL (ref 0–30)
WBC # BLD AUTO: 5 X10(3) UL (ref 4–11)
WBC #/AREA URNS AUTO: <1 /HPF

## 2020-07-17 PROCEDURE — 99396 PREV VISIT EST AGE 40-64: CPT | Performed by: FAMILY MEDICINE

## 2020-07-17 PROCEDURE — 3077F SYST BP >= 140 MM HG: CPT | Performed by: FAMILY MEDICINE

## 2020-07-17 PROCEDURE — 3079F DIAST BP 80-89 MM HG: CPT | Performed by: FAMILY MEDICINE

## 2020-07-17 PROCEDURE — 74183 MRI ABD W/O CNTR FLWD CNTR: CPT | Performed by: INTERNAL MEDICINE

## 2020-07-17 PROCEDURE — 77063 BREAST TOMOSYNTHESIS BI: CPT | Performed by: FAMILY MEDICINE

## 2020-07-17 PROCEDURE — 3075F SYST BP GE 130 - 139MM HG: CPT | Performed by: OBSTETRICS & GYNECOLOGY

## 2020-07-17 PROCEDURE — 99396 PREV VISIT EST AGE 40-64: CPT | Performed by: OBSTETRICS & GYNECOLOGY

## 2020-07-17 PROCEDURE — 99214 OFFICE O/P EST MOD 30 MIN: CPT | Performed by: FAMILY MEDICINE

## 2020-07-17 PROCEDURE — 93000 ELECTROCARDIOGRAM COMPLETE: CPT | Performed by: FAMILY MEDICINE

## 2020-07-17 PROCEDURE — 3008F BODY MASS INDEX DOCD: CPT | Performed by: FAMILY MEDICINE

## 2020-07-17 PROCEDURE — A9581 GADOXETATE DISODIUM INJ: HCPCS | Performed by: INTERNAL MEDICINE

## 2020-07-17 PROCEDURE — 3079F DIAST BP 80-89 MM HG: CPT | Performed by: OBSTETRICS & GYNECOLOGY

## 2020-07-17 PROCEDURE — 77067 SCR MAMMO BI INCL CAD: CPT | Performed by: FAMILY MEDICINE

## 2020-07-17 RX ORDER — FERROUS SULFATE 325(65) MG
325 TABLET ORAL 2 TIMES DAILY WITH MEALS
Qty: 180 TABLET | Refills: 1 | Status: SHIPPED | OUTPATIENT
Start: 2020-07-17

## 2020-07-17 NOTE — PATIENT INSTRUCTIONS
All adult screening ordered and done appropriate for patient's age and gender and risk factors and complaints. Encouraged physical fitness and daily physical activity daily. Monitor blood pressures and record at home. Limit salt intake.   Covid antibiotic

## 2020-07-17 NOTE — TELEPHONE ENCOUNTER
From: Rustam Schofield  To: Radha Mosqueda DO  Sent: 7/17/2020 11:48 AM CDT  Subject: Other    Hi Dr. Timothy Arizmendi     Attached are the reports from [facility] per our conversation today.      Iman Ochoa

## 2020-07-17 NOTE — PROGRESS NOTES
HPI:    Patient ID: Gwendolyn Baldwin is a 47year old female.     This is a 54-year-old -American female here for complete preventive care physical and for status update on any confirmed chronic medical illnesses and follow up on any previous labs or pr also recommended. 3. Iron deficiency anemia, unspecified iron deficiency anemia type  Reviewed and renewed. - Ferrous Sulfate 325 (65 Fe) MG Oral Tab; Take 1 tablet (325 mg total) by mouth 2 (two) times daily with meals. Dispense: 180 tablet;  Refill:

## 2020-07-18 LAB — SARS-COV-2 IGG SERPLBLD QL IA.RAPID: NEGATIVE

## 2020-07-20 NOTE — PROGRESS NOTES
Sandra Morelos is a 47year old female [id number] Patient's last menstrual period was 07/08/2020. who presents for Patient presents with:  Gyn Exam: ANNUAL EXAM  .   Her cycles are  Regular since her hysteroscopy/D&C.   She has occas foul vag odor- I will cultu Minutes per session: Not on file      Stress: Not on file    Relationships      Social connections:        Talks on phone: Not on file        Gets together: Not on file        Attends Orthodoxy service: Not on file        Active member of club or organizat anxiety. Endocrine:   denies excessive thirst or urination. Heme/Lymph:  denies history of anemia, easy bruising or bleeding. Blood pressure 136/86, pulse 73, weight 234 lb 3.2 oz (106.2 kg), last menstrual period 07/08/2020.     PHYSICAL EXAM:   Const (PQF=06176/79737)

## 2020-07-22 ENCOUNTER — TELEPHONE (OUTPATIENT)
Dept: GASTROENTEROLOGY | Facility: CLINIC | Age: 50
End: 2020-07-22

## 2020-07-22 NOTE — TELEPHONE ENCOUNTER
Dr. Kareem Zazueta please advise        Leighton Mendez DO 22 hours ago (5:15 PM)         Good evening Dr. Lucretia Grcae test results. [facility] translate (lol)     CONCLUSION:   1.  There is a grossly stable segment VII les

## 2020-07-22 NOTE — TELEPHONE ENCOUNTER
Recall for liver MRI with Dr Dick Marquis entered in epic for 11/1/2020, health maintenance updated. Dr Hurd Situ message below already sent to Dr Lance Hills.

## 2020-07-22 NOTE — TELEPHONE ENCOUNTER
Dr. Bhandari Rising can you take a look at the patient's results. She has already obtained a copy of them and is looking for translation.   I did let her know that because I did not see the worry malignancy that is always good but also I saw the word follow-up which

## 2020-07-22 NOTE — TELEPHONE ENCOUNTER
D/w patient re: MRI of liver. The large VII lesion may be adenoma or FNH. One is a hemangioma. Other lesions noted, without correlates on other imaging sequences---->f/u in 6 months MRI.     GI Clinical Staff:   Recall MR Liver in Nov 2020 (will plan for

## 2020-07-23 NOTE — TELEPHONE ENCOUNTER
Thanks Dr. Lourdes Coffey, I talked to Garrett Masterson yesterday and we are planning on repeating MRI later this year.

## 2020-10-11 DIAGNOSIS — Z20.822 CLOSE EXPOSURE TO COVID-19 VIRUS: Primary | ICD-10-CM

## 2020-10-12 ENCOUNTER — APPOINTMENT (OUTPATIENT)
Dept: LAB | Age: 50
End: 2020-10-12
Attending: FAMILY MEDICINE
Payer: COMMERCIAL

## 2020-10-12 DIAGNOSIS — Z20.822 CLOSE EXPOSURE TO COVID-19 VIRUS: ICD-10-CM

## 2020-11-11 ENCOUNTER — TELEPHONE (OUTPATIENT)
Dept: GASTROENTEROLOGY | Facility: CLINIC | Age: 50
End: 2020-11-11

## 2020-11-11 DIAGNOSIS — K76.9 LIVER LESION: Primary | ICD-10-CM

## 2020-11-11 NOTE — TELEPHONE ENCOUNTER
Dr Oscar Wooten, please see below recall and place desired order. I will contact patient when received. This will need to go to our PPD auth dept to obtain authorizations. Thanks.

## 2020-11-11 NOTE — TELEPHONE ENCOUNTER
Patient outreach message received. Patient is due for repeat imaging. \"GI Clinical Staff:   Recall MR Liver in Nov 2020 (will plan for MR in Dec before end of year). \"

## 2020-11-16 ENCOUNTER — OFFICE VISIT (OUTPATIENT)
Dept: CARDIOLOGY CLINIC | Facility: CLINIC | Age: 50
End: 2020-11-16
Payer: COMMERCIAL

## 2020-11-16 VITALS
DIASTOLIC BLOOD PRESSURE: 89 MMHG | WEIGHT: 235 LBS | HEIGHT: 63 IN | HEART RATE: 77 BPM | SYSTOLIC BLOOD PRESSURE: 149 MMHG | BODY MASS INDEX: 41.64 KG/M2

## 2020-11-16 DIAGNOSIS — R06.00 DYSPNEA ON EXERTION: Primary | ICD-10-CM

## 2020-11-16 DIAGNOSIS — R03.0 ELEVATED BLOOD PRESSURE READING: ICD-10-CM

## 2020-11-16 PROCEDURE — 3077F SYST BP >= 140 MM HG: CPT | Performed by: INTERNAL MEDICINE

## 2020-11-16 PROCEDURE — 99072 ADDL SUPL MATRL&STAF TM PHE: CPT | Performed by: INTERNAL MEDICINE

## 2020-11-16 PROCEDURE — 3008F BODY MASS INDEX DOCD: CPT | Performed by: INTERNAL MEDICINE

## 2020-11-16 PROCEDURE — 99214 OFFICE O/P EST MOD 30 MIN: CPT | Performed by: INTERNAL MEDICINE

## 2020-11-16 PROCEDURE — 3079F DIAST BP 80-89 MM HG: CPT | Performed by: INTERNAL MEDICINE

## 2020-11-16 NOTE — TELEPHONE ENCOUNTER
MRI Liver recall message sent to the pt via [employer] to schedule and copy of order mailed to pt home address as a reminder to complete.

## 2020-11-16 NOTE — PROGRESS NOTES
Name:  Matt Lott  : 8/10/1970    Date of consultation:   2020    Referring physician: Maurine Bloch, DO    Reason for Visit:  Patient presents with:  HTN: hx Left ankle edema last year, annual f/u today, doing well,       HPI:   Patient Packs/day: 0.50        Years: 23.00        Pack years: 11.5        Types: Cigarettes        Quit date: 6/1/2017        Years since quitting: 3.4      Smokeless tobacco: Never Used    Alcohol use: Yes      Frequency: Never    Drug use: Never       ROS: Possibly due to weight. Calcium score is 0. Not ischemic. Has minimal pulmonary hypertension likely related to weight and prior smoking. No changes. 2. Elevated blood pressure reading  Follow-up with primary care.   Likely will get better when she ha

## 2020-11-24 ENCOUNTER — PATIENT MESSAGE (OUTPATIENT)
Dept: OBGYN CLINIC | Facility: CLINIC | Age: 50
End: 2020-11-24

## 2020-11-24 DIAGNOSIS — Z11.3 SCREENING EXAMINATION FOR STD (SEXUALLY TRANSMITTED DISEASE): Primary | ICD-10-CM

## 2020-11-24 NOTE — TELEPHONE ENCOUNTER
From: Raymond Mckeon  To: Leda Chew MD  Sent: 11/24/2020 1:05 PM CST  Subject: Non-Urgent Medical Question    Good afternoon. I'd like to schedule an appt for a urine & blood STD test as soon as practicable.  Nothing's wrong; recently sexually active

## 2020-11-30 ENCOUNTER — LAB ENCOUNTER (OUTPATIENT)
Dept: LAB | Age: 50
End: 2020-11-30
Attending: FAMILY MEDICINE
Payer: COMMERCIAL

## 2020-11-30 ENCOUNTER — PATIENT MESSAGE (OUTPATIENT)
Dept: OBGYN CLINIC | Facility: CLINIC | Age: 50
End: 2020-11-30

## 2020-11-30 DIAGNOSIS — B35.1 ONYCHOMYCOSIS: ICD-10-CM

## 2020-11-30 DIAGNOSIS — Z11.3 ROUTINE SCREENING FOR STI (SEXUALLY TRANSMITTED INFECTION): ICD-10-CM

## 2020-11-30 PROCEDURE — 80076 HEPATIC FUNCTION PANEL: CPT

## 2020-11-30 PROCEDURE — 86706 HEP B SURFACE ANTIBODY: CPT

## 2020-11-30 PROCEDURE — 80500 HEPATITIS A B + C PROFILE: CPT

## 2020-11-30 PROCEDURE — 86708 HEPATITIS A ANTIBODY: CPT

## 2020-11-30 PROCEDURE — 86780 TREPONEMA PALLIDUM: CPT

## 2020-11-30 PROCEDURE — 86704 HEP B CORE ANTIBODY TOTAL: CPT

## 2020-11-30 PROCEDURE — 87591 N.GONORRHOEAE DNA AMP PROB: CPT

## 2020-11-30 PROCEDURE — 87340 HEPATITIS B SURFACE AG IA: CPT

## 2020-11-30 PROCEDURE — 87491 CHLMYD TRACH DNA AMP PROBE: CPT

## 2020-11-30 PROCEDURE — 86803 HEPATITIS C AB TEST: CPT

## 2020-11-30 PROCEDURE — 87389 HIV-1 AG W/HIV-1&-2 AB AG IA: CPT

## 2020-11-30 PROCEDURE — 36415 COLL VENOUS BLD VENIPUNCTURE: CPT

## 2020-12-03 NOTE — TELEPHONE ENCOUNTER
Message to supervisor. Maggi Duenas- please add pt to CAPs 12/4/2020 schedule at 9:20am 10 min gyne slot for vaginal cx. Pt already aware. Thank you!

## 2020-12-03 NOTE — TELEPHONE ENCOUNTER
Regarding: RE: Non-Urgent Medical Question  Contact: [phone]  ----- Message from Miguel Carter RN sent at 12/2/2020  3:49 PM CST -----       ----- Message sent from Lis Mares RN to Jelena Jewels at 12/2/2020  3:49 PM -----   Wayman Severin

## 2020-12-03 NOTE — TELEPHONE ENCOUNTER
Ok to add pt to my schedule for a 10 min appt at the beginning of my schedule tomorrow for cultures only.   thanks

## 2020-12-04 ENCOUNTER — TELEPHONE (OUTPATIENT)
Dept: PODIATRY CLINIC | Facility: CLINIC | Age: 50
End: 2020-12-04

## 2020-12-04 ENCOUNTER — OFFICE VISIT (OUTPATIENT)
Dept: OBGYN CLINIC | Facility: CLINIC | Age: 50
End: 2020-12-04
Payer: COMMERCIAL

## 2020-12-04 VITALS — HEART RATE: 72 BPM | SYSTOLIC BLOOD PRESSURE: 152 MMHG | DIASTOLIC BLOOD PRESSURE: 91 MMHG

## 2020-12-04 DIAGNOSIS — N89.8 DISCHARGE FROM THE VAGINA: Primary | ICD-10-CM

## 2020-12-04 PROCEDURE — 3080F DIAST BP >= 90 MM HG: CPT | Performed by: OBSTETRICS & GYNECOLOGY

## 2020-12-04 PROCEDURE — 3077F SYST BP >= 140 MM HG: CPT | Performed by: OBSTETRICS & GYNECOLOGY

## 2020-12-04 PROCEDURE — 99213 OFFICE O/P EST LOW 20 MIN: CPT | Performed by: OBSTETRICS & GYNECOLOGY

## 2020-12-04 NOTE — TELEPHONE ENCOUNTER
Called pt and she states has to have f/u Mri with Dr. Andrea Mckeon for her liver and she needs to check with him if ok to start lamisil. She will f/u with us once she has s/w him. She wants the name of the medication sent through [employer].

## 2020-12-04 NOTE — TELEPHONE ENCOUNTER
----- Message from Thomas Yoo DPM sent at 12/1/2020  8:50 AM CST -----  Hepatic function panel is normal please call in the following prescription:   Lamisil 250 mg tablets  Dispense 45, no refill  Instructions take 1 tablet daily p.o.   Then please

## 2020-12-04 NOTE — PROGRESS NOTES
Devon Lawton    8/10/1970       Patient presents with:  Gyn Problem: VAGINAL DISCHARGE / Phoebe Mention    Pt has been sexually active after years of celibacy. She has a 'runny\" clear discharge with a slight odor but not foul.   She had std testing done with

## 2020-12-22 ENCOUNTER — HOSPITAL ENCOUNTER (OUTPATIENT)
Dept: MRI IMAGING | Facility: HOSPITAL | Age: 50
Discharge: HOME | End: 2020-12-22
Attending: INTERNAL MEDICINE
Payer: COMMERCIAL

## 2020-12-22 DIAGNOSIS — K76.9 LIVER LESION: ICD-10-CM

## 2020-12-22 PROCEDURE — 74183 MRI ABD W/O CNTR FLWD CNTR: CPT | Performed by: INTERNAL MEDICINE

## 2020-12-22 PROCEDURE — A9575 INJ GADOTERATE MEGLUMI 0.1ML: HCPCS | Performed by: INTERNAL MEDICINE

## 2020-12-23 ENCOUNTER — TELEPHONE (OUTPATIENT)
Dept: GASTROENTEROLOGY | Facility: CLINIC | Age: 50
End: 2020-12-23

## 2020-12-23 ENCOUNTER — PATIENT MESSAGE (OUTPATIENT)
Dept: GASTROENTEROLOGY | Facility: CLINIC | Age: 50
End: 2020-12-23

## 2020-12-23 DIAGNOSIS — D13.4 HEPATIC ADENOMA: Primary | ICD-10-CM

## 2020-12-23 NOTE — TELEPHONE ENCOUNTER
Referral for Dr. Toro Counts generated. [employer] message sent to patient with Dr. Marjorie Tanner information.

## 2020-12-23 NOTE — TELEPHONE ENCOUNTER
From: Jamison Choudhury  To: Janie Stuart MD  Sent: 12/23/2020 12:21 PM CST  Subject: Test Results Question    Good morning Dr. Thony Mesa.      Can you “translate” yesterday’s test results & forward this to Dr. Morgan Hunt

## 2020-12-23 NOTE — TELEPHONE ENCOUNTER
D/w patient re: MRI Liver results. \"Redemonstration an arterially enhancing lesion in segment VII, which may represent a hepatic adenoma or, less likely, focal nodular hyperplasia. \" It has not grown in size, but it is borderline 5 cm and thus need to

## 2020-12-23 NOTE — TELEPHONE ENCOUNTER
Hepatic Adenoma - Discussed with patient diagnosis, benign nature of adenoma.  Hepatic adenomas have a potential for malignancy transformation and there is a strong association with the use of oral contraceptives, anabolic androgens, and glycogen storag

## 2020-12-23 NOTE — TELEPHONE ENCOUNTER
Patient calling to go over MRI results. Please advise. Thank you. FINDINGS:          COMMENT:       Overall examination quality is significantly compromised by patient motion artifact. LUNG BASES:  No significant signal abnormality is identified.     L No lymphadenopathy. ABDOMINAL WALL:      There is a small fat-containing umbilical hernia. BONES:             Suboptimally assessed by scan protocol, but without grossly apparent bony lesion or fracture.  Reciprocal endplate signal abnormalities are l

## 2020-12-24 NOTE — TELEPHONE ENCOUNTER
Dr. Jono Khan please clarify. Would you like to refer the patient to Dr. Jaimie Payton? If so OK to give the phone number for [facility] [phone]? Or did you want her to schedule with the surgical and plastic reconstruction group at [phone]?  Thanks

## 2020-12-28 NOTE — TELEPHONE ENCOUNTER
See TE from 12/23/2020 as pt was already provided Dr. Peterson information as per MB RN documentation.

## 2020-12-28 NOTE — TELEPHONE ENCOUNTER
Yes please refer to Dr. Martin Adrian at [facility] (who also comes to [facility] as well).  Dr. Manuela Topete (hepatology): [phone] ([phone])       -------------------------------------------------------------------    12/23/20---D/w patient re: MRI Liver results.     \"

## 2021-01-11 ENCOUNTER — OFFICE VISIT (OUTPATIENT)
Dept: SURGERY | Facility: CLINIC | Age: 51
End: 2021-01-11
Payer: COMMERCIAL

## 2021-01-11 VITALS
HEIGHT: 63 IN | WEIGHT: 237 LBS | DIASTOLIC BLOOD PRESSURE: 85 MMHG | SYSTOLIC BLOOD PRESSURE: 129 MMHG | RESPIRATION RATE: 16 BRPM | OXYGEN SATURATION: 99 % | HEART RATE: 90 BPM | BODY MASS INDEX: 41.99 KG/M2

## 2021-01-11 DIAGNOSIS — K76.9 LIVER LESION: Primary | ICD-10-CM

## 2021-01-11 NOTE — PROGRESS NOTES
[facility]  [address], [address]  [address]  [phone] ([phone]) Drug use: Never         Current Outpatient Medications:   •  Ferrous Sulfate 325 (65 Fe) MG Oral Tab, Take 1 tablet (325 mg total) by mouth 2 (two) times daily with meals. , Disp: 180 tablet, Rfl: 1    Exam:  Vitals per chart  Gen: NAD  HEENT: Anicteric  L

## 2021-01-15 ENCOUNTER — APPOINTMENT (OUTPATIENT)
Dept: ULTRASOUND IMAGING | Facility: HOSPITAL | Age: 51
End: 2021-01-15
Attending: EMERGENCY MEDICINE
Payer: COMMERCIAL

## 2021-01-15 ENCOUNTER — HOSPITAL ENCOUNTER (EMERGENCY)
Facility: HOSPITAL | Age: 51
Discharge: HOME | End: 2021-01-15
Attending: EMERGENCY MEDICINE
Payer: COMMERCIAL

## 2021-01-15 VITALS
RESPIRATION RATE: 18 BRPM | WEIGHT: 230 LBS | TEMPERATURE: 97 F | DIASTOLIC BLOOD PRESSURE: 81 MMHG | HEIGHT: 63 IN | HEART RATE: 67 BPM | BODY MASS INDEX: 40.75 KG/M2 | SYSTOLIC BLOOD PRESSURE: 155 MMHG | OXYGEN SATURATION: 100 %

## 2021-01-15 DIAGNOSIS — D25.9 UTERINE LEIOMYOMA, UNSPECIFIED LOCATION: Primary | ICD-10-CM

## 2021-01-15 LAB
ANION GAP SERPL CALC-SCNC: 5 MMOL/L (ref 0–18)
BASOPHILS # BLD AUTO: 0.01 X10(3) UL (ref 0–0.2)
BASOPHILS NFR BLD AUTO: 0.1 %
BUN BLD-MCNC: 15 MG/DL (ref 7–18)
BUN/CREAT SERPL: 17.9 (ref 10–20)
CALCIUM BLD-MCNC: 9.1 MG/DL (ref 8.5–10.1)
CHLORIDE SERPL-SCNC: 109 MMOL/L (ref 98–112)
CO2 SERPL-SCNC: 27 MMOL/L (ref 21–32)
CREAT BLD-MCNC: 0.84 MG/DL
DEPRECATED RDW RBC AUTO: 50 FL (ref 35.1–46.3)
EOSINOPHIL # BLD AUTO: 0.05 X10(3) UL (ref 0–0.7)
EOSINOPHIL NFR BLD AUTO: 0.7 %
ERYTHROCYTE [DISTWIDTH] IN BLOOD BY AUTOMATED COUNT: 15 % (ref 11–15)
GLUCOSE BLD-MCNC: 115 MG/DL (ref 70–99)
HCT VFR BLD AUTO: 35.6 %
HGB BLD-MCNC: 11.1 G/DL
IMM GRANULOCYTES # BLD AUTO: 0.02 X10(3) UL (ref 0–1)
IMM GRANULOCYTES NFR BLD: 0.3 %
LYMPHOCYTES # BLD AUTO: 1.93 X10(3) UL (ref 1–4)
LYMPHOCYTES NFR BLD AUTO: 27.7 %
MCH RBC QN AUTO: 28.5 PG (ref 26–34)
MCHC RBC AUTO-ENTMCNC: 31.2 G/DL (ref 31–37)
MCV RBC AUTO: 91.3 FL
MONOCYTES # BLD AUTO: 0.53 X10(3) UL (ref 0.1–1)
MONOCYTES NFR BLD AUTO: 7.6 %
NEUTROPHILS # BLD AUTO: 4.44 X10 (3) UL (ref 1.5–7.7)
NEUTROPHILS # BLD AUTO: 4.44 X10(3) UL (ref 1.5–7.7)
NEUTROPHILS NFR BLD AUTO: 63.6 %
OSMOLALITY SERPL CALC.SUM OF ELEC: 294 MOSM/KG (ref 275–295)
PLATELET # BLD AUTO: 430 10(3)UL (ref 150–450)
POTASSIUM SERPL-SCNC: 4.2 MMOL/L (ref 3.5–5.1)
RBC # BLD AUTO: 3.9 X10(6)UL
SODIUM SERPL-SCNC: 141 MMOL/L (ref 136–145)
WBC # BLD AUTO: 7 X10(3) UL (ref 4–11)

## 2021-01-15 PROCEDURE — 85025 COMPLETE CBC W/AUTO DIFF WBC: CPT | Performed by: EMERGENCY MEDICINE

## 2021-01-15 PROCEDURE — 76830 TRANSVAGINAL US NON-OB: CPT | Performed by: EMERGENCY MEDICINE

## 2021-01-15 PROCEDURE — 36415 COLL VENOUS BLD VENIPUNCTURE: CPT

## 2021-01-15 PROCEDURE — 99284 EMERGENCY DEPT VISIT MOD MDM: CPT

## 2021-01-15 PROCEDURE — 80048 BASIC METABOLIC PNL TOTAL CA: CPT | Performed by: EMERGENCY MEDICINE

## 2021-01-15 NOTE — ED PROVIDER NOTES
Patient Seen in: [facility] AND [facility] Emergency Department      History   Patient presents with:  Abdomen/Flank Pain    Stated Complaint: Abd Pain    HPI/Subjective:   HPI    48-year-old female with past medical history significant for sleep apnea, anemia, above.    Physical Exam     ED Triage Vitals [01/15/21 2742]   BP (!) 166/75   Pulse 77   Resp 18   Temp 97.2 °F (36.2 °C)   Temp src    SpO2 100 %   O2 Device        Current:/81   Pulse 67   Temp 97.2 °F (36.2 °C)   Resp 18   Ht 160 cm (5' 3\")   Wt tests on the individual orders. RAINBOW DRAW BLUE   RAINBOW DRAW LAVENDER   RAINBOW DRAW LIGHT GREEN   RAINBOW DRAW GOLD          Us Pelvis (transvaginal Pelvis) (cpt=76830)    Result Date: 1/15/2021  CONCLUSION:   1. Lobulated fibroid uterus.   Two domin

## 2021-01-26 ENCOUNTER — OFFICE VISIT (OUTPATIENT)
Dept: OBGYN CLINIC | Facility: CLINIC | Age: 51
End: 2021-01-26
Payer: COMMERCIAL

## 2021-01-26 ENCOUNTER — TELEPHONE (OUTPATIENT)
Dept: OBGYN CLINIC | Facility: CLINIC | Age: 51
End: 2021-01-26

## 2021-01-26 VITALS
WEIGHT: 242 LBS | SYSTOLIC BLOOD PRESSURE: 148 MMHG | DIASTOLIC BLOOD PRESSURE: 81 MMHG | BODY MASS INDEX: 43 KG/M2 | HEART RATE: 89 BPM

## 2021-01-26 DIAGNOSIS — R10.2 PELVIC PAIN IN FEMALE: Primary | ICD-10-CM

## 2021-01-26 DIAGNOSIS — D21.9 FIBROID: ICD-10-CM

## 2021-01-26 DIAGNOSIS — N92.0 MENORRHAGIA WITH REGULAR CYCLE: ICD-10-CM

## 2021-01-26 PROCEDURE — 99214 OFFICE O/P EST MOD 30 MIN: CPT | Performed by: OBSTETRICS & GYNECOLOGY

## 2021-01-26 PROCEDURE — 3079F DIAST BP 80-89 MM HG: CPT | Performed by: OBSTETRICS & GYNECOLOGY

## 2021-01-26 PROCEDURE — 3077F SYST BP >= 140 MM HG: CPT | Performed by: OBSTETRICS & GYNECOLOGY

## 2021-01-26 RX ORDER — IBUPROFEN 800 MG/1
TABLET ORAL
COMMUNITY
Start: 2021-01-18 | End: 2021-04-08

## 2021-01-26 NOTE — TELEPHONE ENCOUNTER
Informed pt of CAP recs below. Assisted pt with scheduling appt for 1/30/2021 at 930am at [facility]. Advised pt to take 600 mg of Ibuprofen with food 30-60 minutes before appt. Pt verbalized understanding. CAP- do you want pt to have Cytotec?

## 2021-01-26 NOTE — TELEPHONE ENCOUNTER
Please call pt and explain that I would like for her to have an EMB due to the heavy menses. I need to biopsy her lining to make sure that this is the fibroids causing the bleeding and not any abnormal cells - as a precaution in preparation for possible hysterectomy. I did not go over this in detail this am due to we were discussing hysterectomy and other plan of care. Please schedule her for this at her convenience- I only ask that she not be heavily bleeding at the time.

## 2021-01-26 NOTE — PROGRESS NOTES
Baldemar Hampton    8/10/1970       Patient presents with:  Er F/u: very little pain today   1/15/21 pt went to ER for severe pelvic pain 10/10 and bleeding. Pt states she went to the ER and did US with findings of fibroids.  Pt states she takes Aleve wit measuring 6.0 x 4.5 x 5.1 cm. OVARIES AND ADNEXA:                RIGHT:              Nonvisualized right ovary. LEFT:    Measures 5.2 x 3.2 x 4.0 cm . Simple cyst measures 3.1 x 2.3 cm      CUL-DE-SAC:   Normal.  No free fluid or mass.     OTHER: EMB- pt to schedule for this. 30 minutes spent in this discussion and plan.

## 2021-01-30 ENCOUNTER — PATIENT MESSAGE (OUTPATIENT)
Dept: OBGYN CLINIC | Facility: CLINIC | Age: 51
End: 2021-01-30

## 2021-01-30 ENCOUNTER — OFFICE VISIT (OUTPATIENT)
Dept: OBGYN CLINIC | Facility: CLINIC | Age: 51
End: 2021-01-30
Payer: COMMERCIAL

## 2021-01-30 VITALS — HEART RATE: 93 BPM | SYSTOLIC BLOOD PRESSURE: 147 MMHG | DIASTOLIC BLOOD PRESSURE: 85 MMHG

## 2021-01-30 DIAGNOSIS — N92.0 EXCESSIVE OR FREQUENT MENSTRUATION: Primary | ICD-10-CM

## 2021-01-30 DIAGNOSIS — Z32.00 PREGNANCY EXAMINATION OR TEST, PREGNANCY UNCONFIRMED: ICD-10-CM

## 2021-01-30 LAB
CONTROL LINE PRESENT WITH A CLEAR BACKGROUND (YES/NO): YES YES/NO
PREGNANCY TEST, URINE: NEGATIVE

## 2021-01-30 PROCEDURE — 3077F SYST BP >= 140 MM HG: CPT | Performed by: OBSTETRICS & GYNECOLOGY

## 2021-01-30 PROCEDURE — 58100 BIOPSY OF UTERUS LINING: CPT | Performed by: OBSTETRICS & GYNECOLOGY

## 2021-01-30 PROCEDURE — 3079F DIAST BP 80-89 MM HG: CPT | Performed by: OBSTETRICS & GYNECOLOGY

## 2021-01-30 PROCEDURE — 81025 URINE PREGNANCY TEST: CPT | Performed by: OBSTETRICS & GYNECOLOGY

## 2021-01-30 NOTE — TELEPHONE ENCOUNTER
From: Rustam Schofield  To: Amanda Chew MD  Sent: 1/30/2021 9:29 AM CST  Subject: Other    Parking now.  Will be up in 5 mins

## 2021-02-09 ENCOUNTER — NURSE ONLY (OUTPATIENT)
Dept: LAB | Age: 51
End: 2021-02-09
Attending: RADIOLOGY
Payer: COMMERCIAL

## 2021-02-09 ENCOUNTER — LAB ENCOUNTER (OUTPATIENT)
Dept: LAB | Age: 51
End: 2021-02-09
Attending: RADIOLOGY
Payer: COMMERCIAL

## 2021-02-09 DIAGNOSIS — Z01.818 PRE-OP TESTING: ICD-10-CM

## 2021-02-09 LAB
BASOPHILS # BLD AUTO: 0.02 X10(3) UL (ref 0–0.2)
BASOPHILS NFR BLD AUTO: 0.4 %
DEPRECATED RDW RBC AUTO: 54 FL (ref 35.1–46.3)
EOSINOPHIL # BLD AUTO: 0.03 X10(3) UL (ref 0–0.7)
EOSINOPHIL NFR BLD AUTO: 0.6 %
ERYTHROCYTE [DISTWIDTH] IN BLOOD BY AUTOMATED COUNT: 15.7 % (ref 11–15)
HCT VFR BLD AUTO: 36 %
HGB BLD-MCNC: 11 G/DL
IMM GRANULOCYTES # BLD AUTO: 0.01 X10(3) UL (ref 0–1)
IMM GRANULOCYTES NFR BLD: 0.2 %
INR BLD: 1.09 (ref 0.9–1.2)
LYMPHOCYTES # BLD AUTO: 1.3 X10(3) UL (ref 1–4)
LYMPHOCYTES NFR BLD AUTO: 24.1 %
MCH RBC QN AUTO: 28.6 PG (ref 26–34)
MCHC RBC AUTO-ENTMCNC: 30.6 G/DL (ref 31–37)
MCV RBC AUTO: 93.8 FL
MONOCYTES # BLD AUTO: 0.51 X10(3) UL (ref 0.1–1)
MONOCYTES NFR BLD AUTO: 9.5 %
NEUTROPHILS # BLD AUTO: 3.52 X10 (3) UL (ref 1.5–7.7)
NEUTROPHILS # BLD AUTO: 3.52 X10(3) UL (ref 1.5–7.7)
NEUTROPHILS NFR BLD AUTO: 65.2 %
PLATELET # BLD AUTO: 400 10(3)UL (ref 150–450)
PROTHROMBIN TIME: 13.9 SECONDS (ref 11.8–14.5)
RBC # BLD AUTO: 3.84 X10(6)UL
WBC # BLD AUTO: 5.4 X10(3) UL (ref 4–11)

## 2021-02-09 PROCEDURE — 85610 PROTHROMBIN TIME: CPT

## 2021-02-09 PROCEDURE — 85025 COMPLETE CBC W/AUTO DIFF WBC: CPT

## 2021-02-09 PROCEDURE — 36415 COLL VENOUS BLD VENIPUNCTURE: CPT

## 2021-02-10 LAB — SARS-COV-2 RNA RESP QL NAA+PROBE: NOT DETECTED

## 2021-02-12 ENCOUNTER — APPOINTMENT (OUTPATIENT)
Dept: CT IMAGING | Facility: HOSPITAL | Age: 51
End: 2021-02-12
Attending: INTERNAL MEDICINE
Payer: COMMERCIAL

## 2021-02-12 ENCOUNTER — HOSPITAL ENCOUNTER (OUTPATIENT)
Dept: INTERVENTIONAL RADIOLOGY/VASCULAR | Facility: HOSPITAL | Age: 51
Discharge: HOME | End: 2021-02-12
Attending: RADIOLOGY | Admitting: RADIOLOGY
Payer: COMMERCIAL

## 2021-02-12 VITALS
WEIGHT: 242 LBS | DIASTOLIC BLOOD PRESSURE: 97 MMHG | HEART RATE: 76 BPM | SYSTOLIC BLOOD PRESSURE: 176 MMHG | RESPIRATION RATE: 24 BRPM | BODY MASS INDEX: 43 KG/M2 | OXYGEN SATURATION: 99 % | TEMPERATURE: 99 F

## 2021-02-12 DIAGNOSIS — K76.9 LIVER LESION: ICD-10-CM

## 2021-02-12 DIAGNOSIS — Z01.818 PRE-OP TESTING: Primary | ICD-10-CM

## 2021-02-12 DIAGNOSIS — R16.0 LIVER MASS, RIGHT LOBE: ICD-10-CM

## 2021-02-12 PROCEDURE — 47000 NEEDLE BIOPSY OF LIVER PERQ: CPT | Performed by: INTERNAL MEDICINE

## 2021-02-12 PROCEDURE — 88342 IMHCHEM/IMCYTCHM 1ST ANTB: CPT | Performed by: INTERNAL MEDICINE

## 2021-02-12 PROCEDURE — 88313 SPECIAL STAINS GROUP 2: CPT

## 2021-02-12 PROCEDURE — BF251ZZ COMPUTERIZED TOMOGRAPHY (CT SCAN) OF LIVER USING LOW OSMOLAR CONTRAST: ICD-10-PCS | Performed by: RADIOLOGY

## 2021-02-12 PROCEDURE — 88313 SPECIAL STAINS GROUP 2: CPT | Performed by: INTERNAL MEDICINE

## 2021-02-12 PROCEDURE — 88342 IMHCHEM/IMCYTCHM 1ST ANTB: CPT

## 2021-02-12 PROCEDURE — 88323 CONSLTJ&REPRT MATRL PREP SLD: CPT

## 2021-02-12 PROCEDURE — 88325 CONSLTJ COMPRE RVW REC REPRT: CPT | Performed by: RADIOLOGY

## 2021-02-12 PROCEDURE — 88341 IMHCHEM/IMCYTCHM EA ADD ANTB: CPT | Performed by: INTERNAL MEDICINE

## 2021-02-12 PROCEDURE — 36415 COLL VENOUS BLD VENIPUNCTURE: CPT

## 2021-02-12 PROCEDURE — 88333 PATH CONSLTJ SURG CYTO XM 1: CPT | Performed by: INTERNAL MEDICINE

## 2021-02-12 PROCEDURE — 0FB13ZX EXCISION OF RIGHT LOBE LIVER, PERCUTANEOUS APPROACH, DIAGNOSTIC: ICD-10-PCS | Performed by: RADIOLOGY

## 2021-02-12 PROCEDURE — 99153 MOD SED SAME PHYS/QHP EA: CPT | Performed by: INTERNAL MEDICINE

## 2021-02-12 PROCEDURE — 88307 TISSUE EXAM BY PATHOLOGIST: CPT | Performed by: INTERNAL MEDICINE

## 2021-02-12 PROCEDURE — 77012 CT SCAN FOR NEEDLE BIOPSY: CPT | Performed by: INTERNAL MEDICINE

## 2021-02-12 PROCEDURE — 99152 MOD SED SAME PHYS/QHP 5/>YRS: CPT | Performed by: INTERNAL MEDICINE

## 2021-02-12 RX ORDER — FLUMAZENIL 0.1 MG/ML
0.2 INJECTION, SOLUTION INTRAVENOUS AS NEEDED
Status: DISCONTINUED | OUTPATIENT
Start: 2021-02-12 | End: 2021-02-12

## 2021-02-12 RX ORDER — MIDAZOLAM HYDROCHLORIDE 1 MG/ML
INJECTION INTRAMUSCULAR; INTRAVENOUS
Status: DISCONTINUED
Start: 2021-02-12 | End: 2021-02-12 | Stop reason: WASHOUT

## 2021-02-12 RX ORDER — SODIUM CHLORIDE 9 MG/ML
INJECTION, SOLUTION INTRAVENOUS CONTINUOUS
Status: DISCONTINUED | OUTPATIENT
Start: 2021-02-12 | End: 2021-02-12

## 2021-02-12 RX ORDER — NALOXONE HYDROCHLORIDE 0.4 MG/ML
80 INJECTION, SOLUTION INTRAMUSCULAR; INTRAVENOUS; SUBCUTANEOUS AS NEEDED
Status: DISCONTINUED | OUTPATIENT
Start: 2021-02-12 | End: 2021-02-12

## 2021-02-12 RX ORDER — MIDAZOLAM HYDROCHLORIDE 1 MG/ML
1 INJECTION INTRAMUSCULAR; INTRAVENOUS EVERY 5 MIN PRN
Status: DISCONTINUED | OUTPATIENT
Start: 2021-02-12 | End: 2021-02-12

## 2021-02-12 RX ORDER — ACETAMINOPHEN 325 MG/1
650 TABLET ORAL ONCE
Status: COMPLETED | OUTPATIENT
Start: 2021-02-12 | End: 2021-02-12

## 2021-02-12 RX ORDER — ACETAMINOPHEN 325 MG/1
TABLET ORAL
Status: COMPLETED
Start: 2021-02-12 | End: 2021-02-12

## 2021-02-12 RX ADMIN — MIDAZOLAM HYDROCHLORIDE 1 MG: 1 INJECTION INTRAMUSCULAR; INTRAVENOUS at 14:58:00

## 2021-02-12 RX ADMIN — MIDAZOLAM HYDROCHLORIDE 2 MG: 1 INJECTION INTRAMUSCULAR; INTRAVENOUS at 14:20:00

## 2021-02-12 RX ADMIN — MIDAZOLAM HYDROCHLORIDE 1 MG: 1 INJECTION INTRAMUSCULAR; INTRAVENOUS at 14:28:00

## 2021-02-12 RX ADMIN — ACETAMINOPHEN 650 MG: 325 TABLET ORAL at 16:57:00

## 2021-02-12 NOTE — PRE-SEDATION ASSESSMENT
[facility]  IR Pre-Procedure Sedation Assessment    History of snoring or sleep or apnea?    No    History of previous problems with anesthesia or sedation  No    Physical Findings:  Neck: nl ROM  CV: RRR  PULM: normal respiratory rate/effor

## 2021-02-12 NOTE — IVS NOTE
Pt was able to eat and drink, no complaint of nausea or vomiting. Pt had menstrual cramps but none from puncture sites or around the area.  Discharge instruction was given

## 2021-02-12 NOTE — INTERVAL H&P NOTE
The above referenced H&P was reviewed by Natalia Marr MD on 2/12/2021, the patient was examined and no significant changes have occurred in the patient's condition since the H&P was performed.   Risks, benefits, alternative treatments and consequences

## 2021-02-12 NOTE — BRIEF PROCEDURE NOTE
[facility]OSP - [facility]  Procedure Note    Yady Guo Patient Status:  Outpatient in a Bed    8/10/1970 MRN [id number]   Location [facility] Attending Sam Camara MD   Hosp Day # 0 PCP Diana Acuna,

## 2021-02-12 NOTE — PROGRESS NOTES
1405  Patient arrival to CT room. Dr. Jennifer Zuniga present. Consent reviewed and signed. Patient supine on table. Monitor applied, VSS. IV flushed and patent.  taken.     1420 Time out taken    1421 Chlora prep applied and lidocaine injected  1445 Sampling beg

## 2021-02-16 NOTE — PROGRESS NOTES
I spoke with pathologist today actually and it seems they did hit the abnormal tissue. The challenge is the biopsy is still not clear adenoma versus focal nodular hyperplasia.  He's sending specimen to [facility] where they can do some additional molecular analys

## 2021-02-24 ENCOUNTER — TELEPHONE (OUTPATIENT)
Dept: SURGERY | Facility: CLINIC | Age: 51
End: 2021-02-24

## 2021-02-24 NOTE — TELEPHONE ENCOUNTER
Called patient to get update on symptoms and provided direct call back number if still have any symptoms

## 2021-02-24 NOTE — TELEPHONE ENCOUNTER
----- Message from [address]au[address] Frey sent at 2/20/2021  4:02 AM CST -----  Regarding: RE: Test Results Question  Contact: [phone]  Very slowly. Hurts to breathe deeply, sneeze, lay on the right side. It’s an internal hurt.   Makes me feel my liver is br

## 2021-03-02 ENCOUNTER — TELEPHONE (OUTPATIENT)
Dept: SURGERY | Facility: CLINIC | Age: 51
End: 2021-03-02

## 2021-03-02 DIAGNOSIS — K76.9 LIVER LESION: Primary | ICD-10-CM

## 2021-03-02 NOTE — TELEPHONE ENCOUNTER
Discussed with patient. Will plan on follow up ultrasound Dec given the size of the lesion. No concerns about proceeding with any gyne procedures.      Will plan on ultrasound and follow up December

## 2021-03-04 DIAGNOSIS — Z23 NEED FOR VACCINATION: ICD-10-CM

## 2021-03-16 ENCOUNTER — IMMUNIZATION (OUTPATIENT)
Dept: LAB | Facility: HOSPITAL | Age: 51
End: 2021-03-16
Attending: HOSPITALIST
Payer: COMMERCIAL

## 2021-03-16 DIAGNOSIS — Z23 NEED FOR VACCINATION: Primary | ICD-10-CM

## 2021-03-16 PROCEDURE — 0011A SARSCOV2 VAC 100MCG/0.5ML IM: CPT

## 2021-03-23 ENCOUNTER — PATIENT MESSAGE (OUTPATIENT)
Dept: OBGYN CLINIC | Facility: CLINIC | Age: 51
End: 2021-03-23

## 2021-03-23 NOTE — TELEPHONE ENCOUNTER
From: Matt Lott  To: Brandy Chew MD  Sent: 3/23/2021 1:02 AM CDT  Subject: Referral Request    An endometrial biopsy was performed 01/30/21 with good hemostasis noted.      A CT-guided percutaneous liver biopsy was performed 02/12/21 with findings

## 2021-03-24 NOTE — TELEPHONE ENCOUNTER
To phone room please call pt at [phone] and ask for [facility]. Schedule 20 min surgery consult with LISA. Thank you.

## 2021-03-24 NOTE — TELEPHONE ENCOUNTER
Per our discussion, now that her liver biopsy w/u is complete, she now needs to see Dr Ina Davis for a consultation on robotic hysterectomy- please assist her in scheduling this as soon as he can see her. I have already discussed her with him.

## 2021-03-25 NOTE — TELEPHONE ENCOUNTER
Message to phone room     PSR- please assist pt with scheduling appt with LISA. May use res 24 slot at any location. Thank you!

## 2021-03-25 NOTE — TELEPHONE ENCOUNTER
Patient returned call to schedule consult, but Dr. Duncan Kinney next available appointment was not until May 17th. Patient wants to know if she can be seen sooner and prefers Fridays only.  Patient states she is at work right now so to call her work number fir

## 2021-03-31 ENCOUNTER — TELEPHONE (OUTPATIENT)
Dept: PULMONOLOGY | Facility: CLINIC | Age: 51
End: 2021-03-31

## 2021-03-31 NOTE — TELEPHONE ENCOUNTER
Denial letter received on 3-18-21 stating CPAP device order was denied from 11/5/19. Managed care please advise.

## 2021-04-08 ENCOUNTER — OFFICE VISIT (OUTPATIENT)
Dept: OBGYN CLINIC | Facility: CLINIC | Age: 51
End: 2021-04-08
Payer: COMMERCIAL

## 2021-04-08 VITALS
WEIGHT: 229.63 LBS | BODY MASS INDEX: 41 KG/M2 | DIASTOLIC BLOOD PRESSURE: 82 MMHG | HEART RATE: 88 BPM | SYSTOLIC BLOOD PRESSURE: 132 MMHG

## 2021-04-08 DIAGNOSIS — D50.0 ANEMIA DUE TO BLOOD LOSS, CHRONIC: ICD-10-CM

## 2021-04-08 DIAGNOSIS — D25.1 FIBROIDS, INTRAMURAL: Primary | ICD-10-CM

## 2021-04-08 DIAGNOSIS — N92.0 MENORRHAGIA WITH REGULAR CYCLE: ICD-10-CM

## 2021-04-08 PROCEDURE — 3079F DIAST BP 80-89 MM HG: CPT | Performed by: OBSTETRICS & GYNECOLOGY

## 2021-04-08 PROCEDURE — 3075F SYST BP GE 130 - 139MM HG: CPT | Performed by: OBSTETRICS & GYNECOLOGY

## 2021-04-08 PROCEDURE — 99213 OFFICE O/P EST LOW 20 MIN: CPT | Performed by: OBSTETRICS & GYNECOLOGY

## 2021-04-08 NOTE — PROGRESS NOTES
HPI/Subjective:   Patient ID: Lashonda Greco is a 45year old female. HPI   with regular but heavy menses and known large fibroids. She sees Dr Vannesa Germain for PCP and also Dr Shelia Garcia for [facility].  Dr Chew had discussed hysterectomy and Juanito Velez had asked abo Genitourinary:     Comments: EG, vagina and cervix w/o lesions. Uterus 22 weeks size and laterally near to ASIS. There is no adnexal tenderness but impossible to assess for adnexal mass due to uterine bulk.       Neurological:      Mental Status: She is a

## 2021-04-13 ENCOUNTER — IMMUNIZATION (OUTPATIENT)
Dept: LAB | Facility: HOSPITAL | Age: 51
End: 2021-04-13
Attending: EMERGENCY MEDICINE
Payer: COMMERCIAL

## 2021-04-13 DIAGNOSIS — Z23 NEED FOR VACCINATION: Primary | ICD-10-CM

## 2021-04-13 PROCEDURE — 0012A SARSCOV2 VAC 100MCG/0.5ML IM: CPT

## 2021-07-09 ENCOUNTER — OFFICE VISIT (OUTPATIENT)
Dept: PODIATRY CLINIC | Facility: CLINIC | Age: 51
End: 2021-07-09
Payer: COMMERCIAL

## 2021-07-09 ENCOUNTER — HOSPITAL ENCOUNTER (OUTPATIENT)
Dept: GENERAL RADIOLOGY | Age: 51
Discharge: HOME | End: 2021-07-09
Attending: PODIATRIST
Payer: COMMERCIAL

## 2021-07-09 DIAGNOSIS — M72.2 PLANTAR FASCIITIS: ICD-10-CM

## 2021-07-09 DIAGNOSIS — M77.31 CALCANEAL SPUR OF BOTH FEET: ICD-10-CM

## 2021-07-09 DIAGNOSIS — M19.071 ARTHROSIS OF MIDFOOT, RIGHT: ICD-10-CM

## 2021-07-09 DIAGNOSIS — M77.32 CALCANEAL SPUR OF BOTH FEET: ICD-10-CM

## 2021-07-09 DIAGNOSIS — M19.072 ARTHROSIS OF MIDFOOT, LEFT: ICD-10-CM

## 2021-07-09 DIAGNOSIS — M19.072 ARTHROSIS OF MIDFOOT, LEFT: Primary | ICD-10-CM

## 2021-07-09 PROCEDURE — 73630 X-RAY EXAM OF FOOT: CPT | Performed by: PODIATRIST

## 2021-07-09 PROCEDURE — 99213 OFFICE O/P EST LOW 20 MIN: CPT | Performed by: PODIATRIST

## 2021-07-11 NOTE — PROGRESS NOTES
Loyda Blanchard is a 45year old female. Patient presents with: Foot Pain: Present for bilateral foot pain. Ongoing for the past couple of months. Has some swelling on legs and ankles. Pain scale 2/10.          HPI:   Patient returns to the clinic complainin tobacco: Never Used    Vaping Use      Vaping Use: Never used    Substance and Sexual Activity      Alcohol use: Yes      Drug use: Never      Sexual activity: Not Currently        Partners: Male          REVIEW OF SYSTEMS:   Today reviewed systens as docu FLAKO

## 2021-07-15 ENCOUNTER — OFFICE VISIT (OUTPATIENT)
Dept: PODIATRY CLINIC | Facility: CLINIC | Age: 51
End: 2021-07-15
Payer: COMMERCIAL

## 2021-07-15 VITALS — WEIGHT: 229 LBS | BODY MASS INDEX: 40.57 KG/M2 | HEIGHT: 63 IN

## 2021-07-15 DIAGNOSIS — M72.2 PLANTAR FASCIITIS OF RIGHT FOOT: ICD-10-CM

## 2021-07-15 DIAGNOSIS — M79.672 INFLAMMATORY HEEL PAIN, LEFT: ICD-10-CM

## 2021-07-15 DIAGNOSIS — M79.671 INFLAMMATORY HEEL PAIN, RIGHT: ICD-10-CM

## 2021-07-15 DIAGNOSIS — M77.32 CALCANEAL SPUR OF BOTH FEET: ICD-10-CM

## 2021-07-15 DIAGNOSIS — M77.31 CALCANEAL SPUR OF BOTH FEET: ICD-10-CM

## 2021-07-15 DIAGNOSIS — M72.2 PLANTAR FASCIITIS OF LEFT FOOT: Primary | ICD-10-CM

## 2021-07-15 PROCEDURE — 3008F BODY MASS INDEX DOCD: CPT | Performed by: PODIATRIST

## 2021-07-18 NOTE — PROGRESS NOTES
Nae Madera is a 45year old female. Patient presents with:   Foot Pain: pt in for f/u on katarzyna foot pain, has xrays in system,  pain at a 2/10 today        HPI:   Patient is here for follow-up on bilateral foot pain she does have x-rays in the system she i Use      Vaping Use: Never used    Substance and Sexual Activity      Alcohol use: Yes      Drug use: Never      Sexual activity: Not Currently        Partners: Male          REVIEW OF SYSTEMS:   Today reviewed systens as documented below  GENERAL HEALTH:

## 2021-10-15 ENCOUNTER — HOSPITAL ENCOUNTER (OUTPATIENT)
Dept: ULTRASOUND IMAGING | Age: 51
Discharge: HOME | End: 2021-10-15
Attending: OBSTETRICS & GYNECOLOGY
Payer: COMMERCIAL

## 2021-10-15 DIAGNOSIS — N92.1 MENORRHAGIA WITH IRREGULAR CYCLE: ICD-10-CM

## 2021-10-15 PROCEDURE — 76856 US EXAM PELVIC COMPLETE: CPT | Performed by: OBSTETRICS & GYNECOLOGY

## 2021-10-15 PROCEDURE — 76830 TRANSVAGINAL US NON-OB: CPT | Performed by: OBSTETRICS & GYNECOLOGY

## 2021-10-22 ENCOUNTER — TELEPHONE (OUTPATIENT)
Dept: OBGYN CLINIC | Facility: CLINIC | Age: 51
End: 2021-10-22

## 2021-10-22 ENCOUNTER — HOSPITAL ENCOUNTER (OUTPATIENT)
Dept: ULTRASOUND IMAGING | Age: 51
Discharge: HOME | End: 2021-10-22
Attending: INTERNAL MEDICINE
Payer: COMMERCIAL

## 2021-10-22 DIAGNOSIS — K76.9 LIVER LESION: ICD-10-CM

## 2021-10-22 PROCEDURE — 76705 ECHO EXAM OF ABDOMEN: CPT | Performed by: INTERNAL MEDICINE

## 2021-10-22 NOTE — TELEPHONE ENCOUNTER
----- Message from Eyad Bautista MD sent at 10/22/2021  3:19 PM CDT -----  Her fibroids have not changed in size but are still large.   LISA did not think that she was a candidate for robotic hysterectomy and asked that she contact me to come in to schedul

## 2021-10-22 NOTE — TELEPHONE ENCOUNTER
Reviewed results and recommendations with patient. She declines PATRICIA, because pain and bleeding have been well-managed. CAP- patient asking if repeat US recommended in 1 year? Last annual July 2020. Scheduled for annual exam on 2/11/22.  Patient woul

## 2021-10-25 NOTE — TELEPHONE ENCOUNTER
We will discuss whether or not she needs a repeat US at her annual exam in February but she does not need an US right now

## 2021-11-22 ENCOUNTER — OFFICE VISIT (OUTPATIENT)
Dept: CARDIOLOGY CLINIC | Facility: CLINIC | Age: 51
End: 2021-11-22
Payer: COMMERCIAL

## 2021-11-22 VITALS
HEIGHT: 63 IN | HEART RATE: 100 BPM | SYSTOLIC BLOOD PRESSURE: 152 MMHG | BODY MASS INDEX: 38.8 KG/M2 | DIASTOLIC BLOOD PRESSURE: 88 MMHG | OXYGEN SATURATION: 98 % | WEIGHT: 219 LBS | RESPIRATION RATE: 22 BRPM

## 2021-11-22 DIAGNOSIS — R60.9 EDEMA, UNSPECIFIED TYPE: ICD-10-CM

## 2021-11-22 DIAGNOSIS — R06.00 DYSPNEA ON EXERTION: Primary | ICD-10-CM

## 2021-11-22 PROCEDURE — 99214 OFFICE O/P EST MOD 30 MIN: CPT | Performed by: INTERNAL MEDICINE

## 2021-11-22 PROCEDURE — 3077F SYST BP >= 140 MM HG: CPT | Performed by: INTERNAL MEDICINE

## 2021-11-22 PROCEDURE — 3079F DIAST BP 80-89 MM HG: CPT | Performed by: INTERNAL MEDICINE

## 2021-11-22 PROCEDURE — 3008F BODY MASS INDEX DOCD: CPT | Performed by: INTERNAL MEDICINE

## 2021-11-22 NOTE — PROGRESS NOTES
Name:  Markus Kelly  : 8/10/1970    Date of consultation:   2021    Referring physician: Shahbaz Hewitt DO    Reason for Visit:  Patient presents with:   Follow - Up  Edema: lower extremity edema  Dyspnea: on exertion  Palpitations: palpitat Alcohol use: Yes    Drug use: Never       ROS:   GENERAL HEALTH: generally feels well, tolerating meds well  RESPIRATORY: no cough or shortness of breath  CARDIOVASCULAR: no chest pain, palpitations  GI: no abdominal pain   NEURO: no syncope or near syncop COMP METABOLIC PANEL (14); Future  - CBC WITH DIFFERENTIAL WITH PLATELET; Future  - PRO BETA NATRIURETIC PEPTIDE; Future  - CARD ECHO 2D DOPPLER (CPT=93306); Future    2. Edema, unspecified type  Likely dependent edema.   Blood work is pending.  - COMP META

## 2021-11-22 NOTE — PATIENT INSTRUCTIONS
Schedule echocardiogram and blood work. If things are stable follow-up with Dr. Robert Bales and Dr. An Carlos  See me in a year.

## 2021-12-13 ENCOUNTER — OFFICE VISIT (OUTPATIENT)
Dept: SURGERY | Facility: CLINIC | Age: 51
End: 2021-12-13
Payer: COMMERCIAL

## 2021-12-13 VITALS
SYSTOLIC BLOOD PRESSURE: 139 MMHG | OXYGEN SATURATION: 99 % | WEIGHT: 219 LBS | HEIGHT: 63 IN | DIASTOLIC BLOOD PRESSURE: 83 MMHG | HEART RATE: 86 BPM | RESPIRATION RATE: 16 BRPM | BODY MASS INDEX: 38.8 KG/M2

## 2021-12-13 DIAGNOSIS — K76.9 LIVER LESION: Primary | ICD-10-CM

## 2021-12-13 NOTE — PROGRESS NOTES
[facility] at [address], [address]  [address]  [phone] ([phone]) Use: Never used    Alcohol use: Yes    Drug use: Never         Current Outpatient Medications:   •  Ferrous Sulfate 325 (65 Fe) MG Oral Tab, Take 1 tablet (325 mg total) by mouth 2 (two) times daily with meals. , Disp: 180 tablet, Rfl: 1    Exam:  Vitals pe

## 2021-12-30 ENCOUNTER — IMMUNIZATION (OUTPATIENT)
Dept: LAB | Facility: HOSPITAL | Age: 51
End: 2021-12-30
Attending: EMERGENCY MEDICINE
Payer: COMMERCIAL

## 2021-12-30 ENCOUNTER — HOSPITAL ENCOUNTER (OUTPATIENT)
Dept: MAMMOGRAPHY | Age: 51
End: 2021-12-30
Attending: FAMILY MEDICINE
Payer: COMMERCIAL

## 2021-12-30 DIAGNOSIS — Z23 NEED FOR VACCINATION: Primary | ICD-10-CM

## 2021-12-30 DIAGNOSIS — Z12.31 ENCOUNTER FOR SCREENING MAMMOGRAM FOR MALIGNANT NEOPLASM OF BREAST: ICD-10-CM

## 2021-12-30 PROCEDURE — 0064A SARSCOV2 VAC 50MCG/0.25ML IM: CPT

## 2022-02-11 ENCOUNTER — HOSPITAL ENCOUNTER (OUTPATIENT)
Dept: MAMMOGRAPHY | Facility: HOSPITAL | Age: 52
Discharge: HOME | End: 2022-02-11
Attending: OBSTETRICS & GYNECOLOGY
Payer: COMMERCIAL

## 2022-02-11 ENCOUNTER — OFFICE VISIT (OUTPATIENT)
Dept: OBGYN CLINIC | Facility: CLINIC | Age: 52
End: 2022-02-11
Payer: COMMERCIAL

## 2022-02-11 VITALS
BODY MASS INDEX: 38.22 KG/M2 | WEIGHT: 213 LBS | HEART RATE: 89 BPM | DIASTOLIC BLOOD PRESSURE: 78 MMHG | SYSTOLIC BLOOD PRESSURE: 149 MMHG | HEIGHT: 62.7 IN

## 2022-02-11 DIAGNOSIS — Z12.31 VISIT FOR SCREENING MAMMOGRAM: ICD-10-CM

## 2022-02-11 DIAGNOSIS — D21.9 FIBROIDS: ICD-10-CM

## 2022-02-11 DIAGNOSIS — Z01.419 ENCOUNTER FOR GYNECOLOGICAL EXAMINATION WITHOUT ABNORMAL FINDING: Primary | ICD-10-CM

## 2022-02-11 DIAGNOSIS — N76.0 VAGINITIS AND VULVOVAGINITIS: ICD-10-CM

## 2022-02-11 PROCEDURE — 3008F BODY MASS INDEX DOCD: CPT | Performed by: OBSTETRICS & GYNECOLOGY

## 2022-02-11 PROCEDURE — 99212 OFFICE O/P EST SF 10 MIN: CPT | Performed by: OBSTETRICS & GYNECOLOGY

## 2022-02-11 PROCEDURE — 77063 BREAST TOMOSYNTHESIS BI: CPT | Performed by: OBSTETRICS & GYNECOLOGY

## 2022-02-11 PROCEDURE — 77067 SCR MAMMO BI INCL CAD: CPT | Performed by: OBSTETRICS & GYNECOLOGY

## 2022-02-11 PROCEDURE — 3077F SYST BP >= 140 MM HG: CPT | Performed by: OBSTETRICS & GYNECOLOGY

## 2022-02-11 PROCEDURE — 3078F DIAST BP <80 MM HG: CPT | Performed by: OBSTETRICS & GYNECOLOGY

## 2022-02-11 PROCEDURE — 99396 PREV VISIT EST AGE 40-64: CPT | Performed by: OBSTETRICS & GYNECOLOGY

## 2022-02-13 LAB
GENITAL VAGINOSIS SCREEN: NEGATIVE
TRICHOMONAS SCREEN: NEGATIVE

## 2022-02-16 ENCOUNTER — HOSPITAL ENCOUNTER (OUTPATIENT)
Dept: ULTRASOUND IMAGING | Facility: HOSPITAL | Age: 52
Discharge: HOME | End: 2022-02-16
Attending: OBSTETRICS & GYNECOLOGY
Payer: COMMERCIAL

## 2022-02-16 DIAGNOSIS — D21.9 FIBROIDS: ICD-10-CM

## 2022-02-16 PROCEDURE — 76856 US EXAM PELVIC COMPLETE: CPT | Performed by: OBSTETRICS & GYNECOLOGY

## 2022-02-16 PROCEDURE — 76830 TRANSVAGINAL US NON-OB: CPT | Performed by: OBSTETRICS & GYNECOLOGY

## 2022-03-15 ENCOUNTER — OFFICE VISIT (OUTPATIENT)
Dept: FAMILY MEDICINE CLINIC | Facility: CLINIC | Age: 52
End: 2022-03-15
Payer: COMMERCIAL

## 2022-03-15 VITALS
DIASTOLIC BLOOD PRESSURE: 80 MMHG | BODY MASS INDEX: 38.98 KG/M2 | WEIGHT: 217.25 LBS | TEMPERATURE: 98 F | HEIGHT: 62.7 IN | HEART RATE: 90 BPM | SYSTOLIC BLOOD PRESSURE: 142 MMHG

## 2022-03-15 DIAGNOSIS — Z00.00 ROUTINE PHYSICAL EXAMINATION: Primary | ICD-10-CM

## 2022-03-15 DIAGNOSIS — D50.9 IRON DEFICIENCY ANEMIA, UNSPECIFIED IRON DEFICIENCY ANEMIA TYPE: ICD-10-CM

## 2022-03-15 DIAGNOSIS — E66.09 CLASS 2 OBESITY DUE TO EXCESS CALORIES WITHOUT SERIOUS COMORBIDITY WITH BODY MASS INDEX (BMI) OF 38.0 TO 38.9 IN ADULT: ICD-10-CM

## 2022-03-15 DIAGNOSIS — Z11.3 ROUTINE SCREENING FOR STI (SEXUALLY TRANSMITTED INFECTION): ICD-10-CM

## 2022-03-15 DIAGNOSIS — R03.0 ELEVATED BLOOD PRESSURE READING: ICD-10-CM

## 2022-03-15 PROCEDURE — 3077F SYST BP >= 140 MM HG: CPT | Performed by: FAMILY MEDICINE

## 2022-03-15 PROCEDURE — 3008F BODY MASS INDEX DOCD: CPT | Performed by: FAMILY MEDICINE

## 2022-03-15 PROCEDURE — 99396 PREV VISIT EST AGE 40-64: CPT | Performed by: FAMILY MEDICINE

## 2022-03-15 PROCEDURE — 3079F DIAST BP 80-89 MM HG: CPT | Performed by: FAMILY MEDICINE

## 2022-03-15 RX ORDER — FERROUS SULFATE 325(65) MG
325 TABLET ORAL 2 TIMES DAILY WITH MEALS
Qty: 180 TABLET | Refills: 1 | Status: SHIPPED | OUTPATIENT
Start: 2022-03-15

## 2022-03-15 NOTE — PATIENT INSTRUCTIONS
All adult screening ordered and done appropriate for patient's age and gender and risk factors and complaints. Recommend weight loss via daily exercising and consistent healthy dietary changes. Encouraged physical fitness and daily physical activity daily (PLAY). Monitor blood pressures and record at home. Limit salt intake. Comply with medications. Patient counseled on the importance of abstinence and if sex occurs of any type condoms should be used every single time. The reality of unwanted pregnancy and all STD including HIV were emphasized.

## 2022-12-16 ENCOUNTER — IMMUNIZATION (OUTPATIENT)
Dept: LAB | Age: 52
End: 2022-12-16
Attending: EMERGENCY MEDICINE
Payer: COMMERCIAL

## 2022-12-16 DIAGNOSIS — Z23 NEED FOR VACCINATION: Primary | ICD-10-CM

## 2022-12-16 PROCEDURE — 0134A SARSCOV2 VAC BVL 50MCG/0.5ML: CPT

## 2022-12-27 ENCOUNTER — OFFICE VISIT (OUTPATIENT)
Dept: FAMILY MEDICINE CLINIC | Facility: CLINIC | Age: 52
End: 2022-12-27
Payer: COMMERCIAL

## 2022-12-27 VITALS
DIASTOLIC BLOOD PRESSURE: 62 MMHG | BODY MASS INDEX: 37.21 KG/M2 | WEIGHT: 210 LBS | HEIGHT: 63 IN | RESPIRATION RATE: 18 BRPM | HEART RATE: 94 BPM | SYSTOLIC BLOOD PRESSURE: 148 MMHG | TEMPERATURE: 99 F | OXYGEN SATURATION: 100 %

## 2022-12-27 DIAGNOSIS — Z20.822 ENCOUNTER FOR LABORATORY TESTING FOR COVID-19 VIRUS: Primary | ICD-10-CM

## 2022-12-27 PROCEDURE — 3008F BODY MASS INDEX DOCD: CPT | Performed by: NURSE PRACTITIONER

## 2022-12-27 PROCEDURE — 3078F DIAST BP <80 MM HG: CPT | Performed by: NURSE PRACTITIONER

## 2022-12-27 PROCEDURE — 3077F SYST BP >= 140 MM HG: CPT | Performed by: NURSE PRACTITIONER

## 2022-12-27 PROCEDURE — 99202 OFFICE O/P NEW SF 15 MIN: CPT | Performed by: NURSE PRACTITIONER

## 2022-12-28 LAB — SARS-COV-2 RNA RESP QL NAA+PROBE: DETECTED

## 2022-12-29 ENCOUNTER — TELEPHONE (OUTPATIENT)
Dept: FAMILY MEDICINE CLINIC | Facility: CLINIC | Age: 52
End: 2022-12-29

## 2022-12-29 NOTE — TELEPHONE ENCOUNTER
Pt calling asking to fill out medical necessity form regarding her heating. Pt recently diagnosed with covid. Offered letter to patient. Pt would like it stated that she was diagnosed with covid. Letter sent.

## 2022-12-31 ENCOUNTER — HOSPITAL ENCOUNTER (EMERGENCY)
Facility: HOSPITAL | Age: 52
Discharge: HOME | End: 2022-12-31
Attending: EMERGENCY MEDICINE
Payer: COMMERCIAL

## 2022-12-31 VITALS
TEMPERATURE: 98 F | HEIGHT: 63 IN | WEIGHT: 200 LBS | HEART RATE: 70 BPM | OXYGEN SATURATION: 100 % | BODY MASS INDEX: 35.44 KG/M2 | DIASTOLIC BLOOD PRESSURE: 73 MMHG | RESPIRATION RATE: 20 BRPM | SYSTOLIC BLOOD PRESSURE: 126 MMHG

## 2022-12-31 DIAGNOSIS — Z86.2 HISTORY OF IRON DEFICIENCY ANEMIA: ICD-10-CM

## 2022-12-31 DIAGNOSIS — D50.9 MICROCYTIC ANEMIA: ICD-10-CM

## 2022-12-31 DIAGNOSIS — H81.399 PERIPHERAL VERTIGO, UNSPECIFIED LATERALITY: Primary | ICD-10-CM

## 2022-12-31 LAB
ANION GAP SERPL CALC-SCNC: 6 MMOL/L (ref 0–18)
ANTIBODY SCREEN: NEGATIVE
BASOPHILS # BLD AUTO: 0.01 X10(3) UL (ref 0–0.2)
BASOPHILS NFR BLD AUTO: 0.2 %
BUN BLD-MCNC: 7 MG/DL (ref 7–18)
BUN/CREAT SERPL: 10.4 (ref 10–20)
CALCIUM BLD-MCNC: 8.6 MG/DL (ref 8.5–10.1)
CHLORIDE SERPL-SCNC: 109 MMOL/L (ref 98–112)
CO2 SERPL-SCNC: 26 MMOL/L (ref 21–32)
CREAT BLD-MCNC: 0.67 MG/DL
DEPRECATED RDW RBC AUTO: 52.9 FL (ref 35.1–46.3)
EOSINOPHIL # BLD AUTO: 0.01 X10(3) UL (ref 0–0.7)
EOSINOPHIL NFR BLD AUTO: 0.2 %
ERYTHROCYTE [DISTWIDTH] IN BLOOD BY AUTOMATED COUNT: 23.5 % (ref 11–15)
GFR SERPLBLD BASED ON 1.73 SQ M-ARVRAT: 104 ML/MIN/1.73M2 (ref 60–?)
GLUCOSE BLD-MCNC: 88 MG/DL (ref 70–99)
HCT VFR BLD AUTO: 26.5 %
HGB BLD-MCNC: 6.3 G/DL
IMM GRANULOCYTES # BLD AUTO: 0.02 X10(3) UL (ref 0–1)
IMM GRANULOCYTES NFR BLD: 0.4 %
LYMPHOCYTES # BLD AUTO: 1.48 X10(3) UL (ref 1–4)
LYMPHOCYTES NFR BLD AUTO: 27.9 %
MCH RBC QN AUTO: 15.3 PG (ref 26–34)
MCHC RBC AUTO-ENTMCNC: 23.8 G/DL (ref 31–37)
MCV RBC AUTO: 64.5 FL
MONOCYTES # BLD AUTO: 0.46 X10(3) UL (ref 0.1–1)
MONOCYTES NFR BLD AUTO: 8.7 %
NEUTROPHILS # BLD AUTO: 3.33 X10 (3) UL (ref 1.5–7.7)
NEUTROPHILS # BLD AUTO: 3.33 X10(3) UL (ref 1.5–7.7)
NEUTROPHILS NFR BLD AUTO: 62.6 %
OSMOLALITY SERPL CALC.SUM OF ELEC: 289 MOSM/KG (ref 275–295)
PLATELET # BLD AUTO: 384 10(3)UL (ref 150–450)
PLATELET MORPHOLOGY: NORMAL
POTASSIUM SERPL-SCNC: 3.8 MMOL/L (ref 3.5–5.1)
RBC # BLD AUTO: 4.11 X10(6)UL
RH BLOOD TYPE: POSITIVE
SODIUM SERPL-SCNC: 141 MMOL/L (ref 136–145)
WBC # BLD AUTO: 5.3 X10(3) UL (ref 4–11)

## 2022-12-31 PROCEDURE — 96375 TX/PRO/DX INJ NEW DRUG ADDON: CPT

## 2022-12-31 PROCEDURE — 86901 BLOOD TYPING SEROLOGIC RH(D): CPT | Performed by: EMERGENCY MEDICINE

## 2022-12-31 PROCEDURE — 96374 THER/PROPH/DIAG INJ IV PUSH: CPT

## 2022-12-31 PROCEDURE — 93005 ELECTROCARDIOGRAM TRACING: CPT

## 2022-12-31 PROCEDURE — 99285 EMERGENCY DEPT VISIT HI MDM: CPT

## 2022-12-31 PROCEDURE — 36430 TRANSFUSION BLD/BLD COMPNT: CPT

## 2022-12-31 PROCEDURE — 93010 ELECTROCARDIOGRAM REPORT: CPT

## 2022-12-31 PROCEDURE — 86900 BLOOD TYPING SEROLOGIC ABO: CPT | Performed by: EMERGENCY MEDICINE

## 2022-12-31 PROCEDURE — 85025 COMPLETE CBC W/AUTO DIFF WBC: CPT | Performed by: EMERGENCY MEDICINE

## 2022-12-31 PROCEDURE — 85060 BLOOD SMEAR INTERPRETATION: CPT | Performed by: EMERGENCY MEDICINE

## 2022-12-31 PROCEDURE — 96361 HYDRATE IV INFUSION ADD-ON: CPT

## 2022-12-31 PROCEDURE — 80048 BASIC METABOLIC PNL TOTAL CA: CPT | Performed by: EMERGENCY MEDICINE

## 2022-12-31 PROCEDURE — 86850 RBC ANTIBODY SCREEN: CPT | Performed by: EMERGENCY MEDICINE

## 2022-12-31 PROCEDURE — 86920 COMPATIBILITY TEST SPIN: CPT

## 2022-12-31 RX ORDER — ONDANSETRON 4 MG/1
4 TABLET, ORALLY DISINTEGRATING ORAL EVERY 4 HOURS PRN
Qty: 10 TABLET | Refills: 0 | Status: SHIPPED | OUTPATIENT
Start: 2022-12-31 | End: 2023-01-07

## 2022-12-31 RX ORDER — MELATONIN
325 2 TIMES DAILY WITH MEALS
Qty: 60 TABLET | Refills: 0 | Status: SHIPPED | OUTPATIENT
Start: 2022-12-31 | End: 2023-01-30

## 2022-12-31 RX ORDER — MECLIZINE HYDROCHLORIDE 25 MG/1
25 TABLET ORAL ONCE
Status: COMPLETED | OUTPATIENT
Start: 2022-12-31 | End: 2022-12-31

## 2022-12-31 RX ORDER — ONDANSETRON 2 MG/ML
4 INJECTION INTRAMUSCULAR; INTRAVENOUS ONCE
Status: COMPLETED | OUTPATIENT
Start: 2022-12-31 | End: 2022-12-31

## 2022-12-31 RX ORDER — MECLIZINE HYDROCHLORIDE 25 MG/1
25 TABLET ORAL 3 TIMES DAILY PRN
Qty: 20 TABLET | Refills: 0 | Status: SHIPPED | OUTPATIENT
Start: 2022-12-31

## 2022-12-31 NOTE — ED PROVIDER NOTES
Signout taken with disposition pending labs - same notable for microcytic anemia with patient eventually noting admitted noncompliance with supplementation without overt hemorrhagic complaints. Symptoms improving with meclizine/ondansetron, in agreement with transfusion and to comply with iron supplementation; primary care messaged regarding ED course/plan of care. Recent Results (from the past 24 hour(s))   EKG    Collection Time: 12/31/22  3:01 PM   Result Value Ref Range    Ventricular rate 75 BPM    Atrial rate 75 BPM    P-R Interval 166 ms    QRS Duration 80 ms    Q-T Interval 368 ms    QTC Calculation (Bezet) 410 ms    P Axis 63 degrees    R Axis 4 degrees    T Axis 5 degrees   CBC W/ DIFFERENTIAL    Collection Time: 12/31/22  3:35 PM   Result Value Ref Range    WBC 5.3 4.0 - 11.0 x10(3) uL    RBC 4.11 3.80 - 5.30 x10(6)uL    HGB 6.3 (LL) 12.0 - 16.0 g/dL    HCT 26.5 (L) 35.0 - 48.0 %    MCV 64.5 (L) 80.0 - 100.0 fL    MCH 15.3 (L) 26.0 - 34.0 pg    MCHC 23.8 (L) 31.0 - 37.0 g/dL    RDW-SD 52.9 (H) 35.1 - 46.3 fL    RDW 23.5 (H) 11.0 - 15.0 %    .0 150.0 - 450.0 10(3)uL    Neutrophil Absolute Prelim 3.33 1.50 - 7.70 x10 (3) uL    Neutrophil Absolute 3.33 1.50 - 7.70 x10(3) uL    Lymphocyte Absolute 1.48 1.00 - 4.00 x10(3) uL    Monocyte Absolute 0.46 0.10 - 1.00 x10(3) uL    Eosinophil Absolute 0.01 0.00 - 0.70 x10(3) uL    Basophil Absolute 0.01 0.00 - 0.20 x10(3) uL    Immature Granulocyte Absolute 0.02 0.00 - 1.00 x10(3) uL    Neutrophil % 62.6 %    Lymphocyte % 27.9 %    Monocyte % 8.7 %    Eosinophil % 0.2 %    Basophil % 0.2 %    Immature Granulocyte % 0.4 %   RBC Morphology Scan    Collection Time: 12/31/22  3:35 PM   Result Value Ref Range    RBC Morphology See morphology below (A) Normal, Slide reviewed, see previous RBC morphology.     Platelet Morphology Normal Normal    Macrocytosis 1+      Microcytosis 2+ (A)      Hypochromia 3+ (A)      Polychromasia 1+      Ovalocytes 1+ Schistocytes 1+      Tear Drop Cells 1+      Target Cells 1+     Basic Metabolic Panel (8)    Collection Time: 12/31/22  3:36 PM   Result Value Ref Range    Glucose 88 70 - 99 mg/dL    Sodium 141 136 - 145 mmol/L    Potassium 3.8 3.5 - 5.1 mmol/L    Chloride 109 98 - 112 mmol/L    CO2 26.0 21.0 - 32.0 mmol/L    Anion Gap 6 0 - 18 mmol/L    BUN 7 7 - 18 mg/dL    Creatinine 0.67 0.55 - 1.02 mg/dL    BUN/CREA Ratio 10.4 10.0 - 20.0    Calcium, Total 8.6 8.5 - 10.1 mg/dL    Calculated Osmolality 289 275 - 295 mOsm/kg    eGFR-Cr 104 >=60 mL/min/1.73m2   ABORH (Blood Type)    Collection Time: 12/31/22  4:00 PM   Result Value Ref Range    ABO BLOOD TYPE A     RH BLOOD TYPE Positive    Antibody Screen    Collection Time: 12/31/22  4:00 PM   Result Value Ref Range    Antibody Screen Negative    Prepare RBC Once    Collection Time: 12/31/22  5:34 PM   Result Value Ref Range    Blood Product [id number]     Unit Number [id number]     UNIT ABO A     UNIT RH POS     Product Status Issued     Expiration Date [id number]     Blood Type Barcode 6200

## 2022-12-31 NOTE — ED INITIAL ASSESSMENT (HPI)
Dizziness and vomiting starting this Thursday. Denies ha/cp/dyspnea/other pain. No otc meds taken.  Pt tested + for covid 12/27

## 2023-01-01 NOTE — ED QUICK NOTES
Patient given discharge instructions and prescriptions sent to pharmacy. Patient verbalized understanding.

## 2023-01-02 LAB
ATRIAL RATE: 75 BPM
BLOOD TYPE BARCODE: 6200
P AXIS: 63 DEGREES
P-R INTERVAL: 166 MS
Q-T INTERVAL: 368 MS
QRS DURATION: 80 MS
QTC CALCULATION (BEZET): 410 MS
R AXIS: 4 DEGREES
T AXIS: 5 DEGREES
VENTRICULAR RATE: 75 BPM

## 2023-01-05 ENCOUNTER — OFFICE VISIT (OUTPATIENT)
Dept: FAMILY MEDICINE CLINIC | Facility: CLINIC | Age: 53
End: 2023-01-05
Payer: COMMERCIAL

## 2023-01-05 VITALS
OXYGEN SATURATION: 99 % | DIASTOLIC BLOOD PRESSURE: 78 MMHG | RESPIRATION RATE: 18 BRPM | SYSTOLIC BLOOD PRESSURE: 158 MMHG | TEMPERATURE: 100 F | HEART RATE: 81 BPM

## 2023-01-05 DIAGNOSIS — R03.0 ELEVATED BLOOD PRESSURE READING: ICD-10-CM

## 2023-01-05 DIAGNOSIS — U07.1 COVID-19: Primary | ICD-10-CM

## 2023-01-06 LAB — SARS-COV-2 RNA RESP QL NAA+PROBE: DETECTED

## 2023-01-11 ENCOUNTER — OFFICE VISIT (OUTPATIENT)
Dept: OBGYN CLINIC | Facility: CLINIC | Age: 53
End: 2023-01-11

## 2023-01-11 ENCOUNTER — LAB ENCOUNTER (OUTPATIENT)
Dept: LAB | Facility: HOSPITAL | Age: 53
End: 2023-01-11
Attending: OBSTETRICS & GYNECOLOGY
Payer: COMMERCIAL

## 2023-01-11 VITALS
WEIGHT: 206.19 LBS | DIASTOLIC BLOOD PRESSURE: 77 MMHG | HEART RATE: 74 BPM | SYSTOLIC BLOOD PRESSURE: 149 MMHG | BODY MASS INDEX: 37 KG/M2

## 2023-01-11 DIAGNOSIS — Z11.3 SCREENING EXAMINATION FOR STD (SEXUALLY TRANSMITTED DISEASE): ICD-10-CM

## 2023-01-11 DIAGNOSIS — Z01.419 ENCOUNTER FOR GYNECOLOGICAL EXAMINATION WITHOUT ABNORMAL FINDING: Primary | ICD-10-CM

## 2023-01-11 DIAGNOSIS — D21.9 FIBROIDS: ICD-10-CM

## 2023-01-11 DIAGNOSIS — Z12.31 VISIT FOR SCREENING MAMMOGRAM: ICD-10-CM

## 2023-01-11 PROCEDURE — 87491 CHLMYD TRACH DNA AMP PROBE: CPT | Performed by: OBSTETRICS & GYNECOLOGY

## 2023-01-11 PROCEDURE — 87591 N.GONORRHOEAE DNA AMP PROB: CPT | Performed by: OBSTETRICS & GYNECOLOGY

## 2023-01-11 PROCEDURE — 87661 TRICHOMONAS VAGINALIS AMPLIF: CPT | Performed by: OBSTETRICS & GYNECOLOGY

## 2023-01-11 PROCEDURE — 87389 HIV-1 AG W/HIV-1&-2 AB AG IA: CPT

## 2023-01-11 PROCEDURE — 86780 TREPONEMA PALLIDUM: CPT

## 2023-01-11 PROCEDURE — 36415 COLL VENOUS BLD VENIPUNCTURE: CPT

## 2023-01-11 PROCEDURE — 87624 HPV HI-RISK TYP POOLED RSLT: CPT | Performed by: OBSTETRICS & GYNECOLOGY

## 2023-01-12 ENCOUNTER — TELEPHONE (OUTPATIENT)
Dept: OBGYN CLINIC | Facility: CLINIC | Age: 53
End: 2023-01-12

## 2023-01-12 DIAGNOSIS — D25.9 UTERINE LEIOMYOMA, UNSPECIFIED LOCATION: Primary | ICD-10-CM

## 2023-01-12 LAB
C TRACH DNA SPEC QL NAA+PROBE: NEGATIVE
HPV I/H RISK 1 DNA SPEC QL NAA+PROBE: NEGATIVE
N GONORRHOEA DNA SPEC QL NAA+PROBE: NEGATIVE

## 2023-01-12 NOTE — TELEPHONE ENCOUNTER
OB GYN SURGICAL SCHEDULING    Assessment: symptomatic uterine fibroid    Pre-Operative Procedure:  Salpingectomy Bilateral and Total Abdomina Hysterectomy    Date:  3/6/2023 at 7:30 am    Admission:  Day Surgery    Anesthesia: General    Additional Orders:  Routine Orders    Comments / Orders to Nurse: Dr Max Heller is available to assist on 3/6/23- would prefer her, needs EMB- please have her schedule this before surgery    Discussed possible complications including but not limited to: Alternatives to surgical intervention discussed with patient in detail., Likely consequences of not undergoing procedure discussed with patient. , anesthesia risks, bleeding, infection, injury, bowel / bladder, postop DVT / PE, postop pneumonia and wound dehiscence

## 2023-01-13 LAB
T PALLIDUM AB SER QL: NEGATIVE
T VAGINALIS RRNA SPEC QL NAA+PROBE: NEGATIVE

## 2023-01-13 NOTE — TELEPHONE ENCOUNTER
Called OR 730am not available on Mon,03/06.  If we want to add case that day case would start at 1pm    Next date to add a Major case at 730am would be Monday, 4/3/23 if the OR has availability

## 2023-01-18 NOTE — TELEPHONE ENCOUNTER
Called OR held Monday,04/03/23 at 730am    States is a possibility we will be able to add case on Monday,03/06, states other office requested it to be on hold and its been on hold now for a couple days. Will call office to check if they will still need block if not we will be allowed to use it.      Demetria [facility] will call me back by noon today with any update

## 2023-01-18 NOTE — TELEPHONE ENCOUNTER
Thais Dozier MD  You 19 hours ago (1:07 PM)     Please try again to see if we can be \"wait-listed\" for 3/6 and if not then try for the 4/5 date

## 2023-01-20 NOTE — TELEPHONE ENCOUNTER
Spoke to pt. Aware surgery is scheduled on Monday,04/03/2023 at 730am    To CAP: pt would like to schedule video visit for consult, has multiple questions, please advise if OK and when can she be added on for surgery    To RN pool please contact pt to help coordinate EMB before surgery.  When I called her was in a meeting , might be best to call her at a later time    To [facility], please advise if available to assist    Pt will send scree shot of insurance card and send it via [employer] once received I will initiate PA for surgery and check if PA is needed for EMB    Major case and antimicrobial wash instructions sent via [employer]     Delayed staff message sent to MD to please place pre-op orders    Entered in book and calender

## 2023-01-21 ENCOUNTER — PATIENT MESSAGE (OUTPATIENT)
Dept: OBGYN CLINIC | Facility: CLINIC | Age: 53
End: 2023-01-21

## 2023-01-21 ENCOUNTER — OFFICE VISIT (OUTPATIENT)
Dept: FAMILY MEDICINE CLINIC | Facility: CLINIC | Age: 53
End: 2023-01-21

## 2023-01-21 VITALS
TEMPERATURE: 98 F | BODY MASS INDEX: 36.5 KG/M2 | WEIGHT: 206 LBS | HEART RATE: 79 BPM | HEIGHT: 63 IN | DIASTOLIC BLOOD PRESSURE: 78 MMHG | SYSTOLIC BLOOD PRESSURE: 132 MMHG

## 2023-01-21 DIAGNOSIS — D25.1 INTRAMURAL LEIOMYOMA OF UTERUS: Primary | ICD-10-CM

## 2023-01-21 DIAGNOSIS — Z23 NEED FOR SHINGLES VACCINE: ICD-10-CM

## 2023-01-21 DIAGNOSIS — Z23 FLU VACCINE NEED: ICD-10-CM

## 2023-01-21 DIAGNOSIS — Z00.00 ANNUAL PHYSICAL EXAM: ICD-10-CM

## 2023-01-21 DIAGNOSIS — D50.9 IRON DEFICIENCY ANEMIA, UNSPECIFIED IRON DEFICIENCY ANEMIA TYPE: ICD-10-CM

## 2023-01-21 LAB
ALBUMIN SERPL-MCNC: 3.2 G/DL (ref 3.4–5)
ALBUMIN/GLOB SERPL: 0.7 {RATIO} (ref 1–2)
ALP LIVER SERPL-CCNC: 57 U/L
ALT SERPL-CCNC: 16 U/L
ANION GAP SERPL CALC-SCNC: 4 MMOL/L (ref 0–18)
AST SERPL-CCNC: 10 U/L (ref 15–37)
BASOPHILS # BLD AUTO: 0.01 X10(3) UL (ref 0–0.2)
BASOPHILS NFR BLD AUTO: 0.2 %
BILIRUB SERPL-MCNC: 0.4 MG/DL (ref 0.1–2)
BILIRUB UR QL: NEGATIVE
BUN BLD-MCNC: 11 MG/DL (ref 7–18)
BUN/CREAT SERPL: 17.2 (ref 10–20)
CALCIUM BLD-MCNC: 9.1 MG/DL (ref 8.5–10.1)
CHLORIDE SERPL-SCNC: 106 MMOL/L (ref 98–112)
CHOLEST SERPL-MCNC: 139 MG/DL (ref ?–200)
CLARITY UR: CLEAR
CO2 SERPL-SCNC: 28 MMOL/L (ref 21–32)
COLOR UR: YELLOW
CREAT BLD-MCNC: 0.64 MG/DL
DEPRECATED RDW RBC AUTO: 75.6 FL (ref 35.1–46.3)
EOSINOPHIL # BLD AUTO: 0.03 X10(3) UL (ref 0–0.7)
EOSINOPHIL NFR BLD AUTO: 0.5 %
ERYTHROCYTE [DISTWIDTH] IN BLOOD BY AUTOMATED COUNT: 29.2 % (ref 11–15)
EST. AVERAGE GLUCOSE BLD GHB EST-MCNC: 91 MG/DL (ref 68–126)
FASTING PATIENT LIPID ANSWER: YES
FASTING STATUS PATIENT QL REPORTED: YES
GFR SERPLBLD BASED ON 1.73 SQ M-ARVRAT: 106 ML/MIN/1.73M2 (ref 60–?)
GLOBULIN PLAS-MCNC: 4.6 G/DL (ref 2.8–4.4)
GLUCOSE BLD-MCNC: 93 MG/DL (ref 70–99)
GLUCOSE UR-MCNC: NEGATIVE MG/DL
HBA1C MFR BLD: 4.8 % (ref ?–5.7)
HCT VFR BLD AUTO: 33.4 %
HDLC SERPL-MCNC: 52 MG/DL (ref 40–59)
HGB BLD-MCNC: 8.4 G/DL
HGB UR QL STRIP.AUTO: NEGATIVE
IMM GRANULOCYTES # BLD AUTO: 0.02 X10(3) UL (ref 0–1)
IMM GRANULOCYTES NFR BLD: 0.4 %
KETONES UR-MCNC: NEGATIVE MG/DL
LDLC SERPL CALC-MCNC: 78 MG/DL (ref ?–100)
LEUKOCYTE ESTERASE UR QL STRIP.AUTO: NEGATIVE
LYMPHOCYTES # BLD AUTO: 1.65 X10(3) UL (ref 1–4)
LYMPHOCYTES NFR BLD AUTO: 29.2 %
MCH RBC QN AUTO: 18.7 PG (ref 26–34)
MCHC RBC AUTO-ENTMCNC: 25.1 G/DL (ref 31–37)
MCV RBC AUTO: 74.4 FL
MONOCYTES # BLD AUTO: 0.55 X10(3) UL (ref 0.1–1)
MONOCYTES NFR BLD AUTO: 9.7 %
NEUTROPHILS # BLD AUTO: 3.4 X10 (3) UL (ref 1.5–7.7)
NEUTROPHILS # BLD AUTO: 3.4 X10(3) UL (ref 1.5–7.7)
NEUTROPHILS NFR BLD AUTO: 60 %
NITRITE UR QL STRIP.AUTO: NEGATIVE
NONHDLC SERPL-MCNC: 87 MG/DL (ref ?–130)
OSMOLALITY SERPL CALC.SUM OF ELEC: 285 MOSM/KG (ref 275–295)
PH UR: 8 [PH] (ref 5–8)
PLATELET # BLD AUTO: 676 10(3)UL (ref 150–450)
POTASSIUM SERPL-SCNC: 4.3 MMOL/L (ref 3.5–5.1)
PROT SERPL-MCNC: 7.8 G/DL (ref 6.4–8.2)
PROT UR-MCNC: 30 MG/DL
RBC # BLD AUTO: 4.49 X10(6)UL
SODIUM SERPL-SCNC: 138 MMOL/L (ref 136–145)
SP GR UR STRIP: 1.02 (ref 1–1.03)
TRIGL SERPL-MCNC: 39 MG/DL (ref 30–149)
TSI SER-ACNC: 0.96 MIU/ML (ref 0.36–3.74)
UROBILINOGEN UR STRIP-ACNC: <2
VIT C UR-MCNC: NEGATIVE MG/DL
VLDLC SERPL CALC-MCNC: 6 MG/DL (ref 0–30)
WBC # BLD AUTO: 5.7 X10(3) UL (ref 4–11)

## 2023-01-21 PROCEDURE — 80061 LIPID PANEL: CPT | Performed by: FAMILY MEDICINE

## 2023-01-21 PROCEDURE — 81001 URINALYSIS AUTO W/SCOPE: CPT | Performed by: FAMILY MEDICINE

## 2023-01-21 PROCEDURE — 80053 COMPREHEN METABOLIC PANEL: CPT | Performed by: FAMILY MEDICINE

## 2023-01-21 PROCEDURE — 84443 ASSAY THYROID STIM HORMONE: CPT | Performed by: FAMILY MEDICINE

## 2023-01-21 PROCEDURE — 83036 HEMOGLOBIN GLYCOSYLATED A1C: CPT | Performed by: FAMILY MEDICINE

## 2023-01-21 PROCEDURE — 85025 COMPLETE CBC W/AUTO DIFF WBC: CPT | Performed by: FAMILY MEDICINE

## 2023-01-21 RX ORDER — MELATONIN
325 2 TIMES DAILY WITH MEALS
Qty: 180 TABLET | Refills: 1 | Status: SHIPPED | OUTPATIENT
Start: 2023-01-21 | End: 2023-07-20

## 2023-01-21 RX ORDER — MISOPROSTOL 200 UG/1
400 TABLET ORAL
Qty: 2 TABLET | Refills: 0 | Status: SHIPPED | OUTPATIENT
Start: 2023-01-21 | End: 2023-01-21

## 2023-01-21 NOTE — PATIENT INSTRUCTIONS
All adult screening ordered and done appropriate for patient's age and gender and risk factors and complaints. Encouraged physical fitness and daily physical activity daily. Recommend weight loss via daily exercising and consistent healthy dietary changes. Keep gynecology appointments as recommended.

## 2023-01-21 NOTE — TELEPHONE ENCOUNTER
From: Yohan Bernal  To: Marycarmen Chew MD  Sent: 1/21/2023 9:22 AM CST  Subject: 2023 updated medical insurance information     Please note attached updated medical insurance policy effective 74/85/54    Kind regards,   Samaria Stauffer

## 2023-01-21 NOTE — TELEPHONE ENCOUNTER
Assisted pt with scheduling EMBX, informed of qual hcg the day before and cytotec the night before. Also dicussed Ibuprofen prep 1 hr before. Pt states understanding. Orders placed.

## 2023-01-21 NOTE — TELEPHONE ENCOUNTER
[facility] to please update insurance information. See attachment. Thanks. Message to referral pool as fyi. Pt is scheduled for surgery in April.

## 2023-01-23 NOTE — TELEPHONE ENCOUNTER
STEPHEN to assist    Surgery change consent sent    Updated book and [phone] number listed on pt card for providers to call and spoke to Jarred Warner who transferred me to [phone] to initiate prior authorization for surgery . I spoke to STEFANIE TABOR [facility] who initiated prior authorization for surgery. And was automatically approved for 2days valid from Monday,04/03/2023-Wednesday,04/05/2023.  Authorization number [id number]    Sent this information to pt via [employer]

## 2023-01-25 ENCOUNTER — PATIENT MESSAGE (OUTPATIENT)
Dept: OBGYN CLINIC | Facility: CLINIC | Age: 53
End: 2023-01-25

## 2023-02-03 ENCOUNTER — TELEPHONE (OUTPATIENT)
Dept: OBGYN CLINIC | Facility: CLINIC | Age: 53
End: 2023-02-03

## 2023-02-03 DIAGNOSIS — D25.9 UTERINE LEIOMYOMA, UNSPECIFIED LOCATION: Primary | ICD-10-CM

## 2023-02-03 RX ORDER — MISOPROSTOL 200 UG/1
400 TABLET ORAL
Qty: 2 TABLET | Refills: 0 | Status: SHIPPED | OUTPATIENT
Start: 2023-02-03 | End: 2023-02-03

## 2023-02-05 ENCOUNTER — LAB ENCOUNTER (OUTPATIENT)
Dept: LAB | Facility: HOSPITAL | Age: 53
End: 2023-02-05
Attending: OBSTETRICS & GYNECOLOGY
Payer: COMMERCIAL

## 2023-02-05 DIAGNOSIS — D25.9 UTERINE LEIOMYOMA, UNSPECIFIED LOCATION: ICD-10-CM

## 2023-02-05 LAB — HCG SERPL QL: NEGATIVE

## 2023-02-05 PROCEDURE — 36415 COLL VENOUS BLD VENIPUNCTURE: CPT

## 2023-02-05 PROCEDURE — 84703 CHORIONIC GONADOTROPIN ASSAY: CPT

## 2023-02-06 ENCOUNTER — PATIENT MESSAGE (OUTPATIENT)
Dept: OBGYN CLINIC | Facility: CLINIC | Age: 53
End: 2023-02-06

## 2023-02-06 ENCOUNTER — OFFICE VISIT (OUTPATIENT)
Dept: OBGYN CLINIC | Facility: CLINIC | Age: 53
End: 2023-02-06

## 2023-02-06 VITALS
SYSTOLIC BLOOD PRESSURE: 156 MMHG | HEART RATE: 92 BPM | BODY MASS INDEX: 36 KG/M2 | DIASTOLIC BLOOD PRESSURE: 90 MMHG | WEIGHT: 206 LBS

## 2023-02-06 DIAGNOSIS — N92.0 MENORRHAGIA WITH REGULAR CYCLE: ICD-10-CM

## 2023-02-06 DIAGNOSIS — N95.0 PMB (POSTMENOPAUSAL BLEEDING): Primary | ICD-10-CM

## 2023-02-07 NOTE — PROCEDURES
Endometrial Biopsy    Pre-Procedure Care:   Consent was obtained. Procedure/risks were explained. Questions were answered. Correct patient was identified. Correct side and site were confirmed. Pregnancy Results: negative from blood test   Birth control method(s) used:  none    Pre-Medications: The patient was premedicated with vicoden x2 . Description of Procedure:  Under satisfactory analgesia, the patient was prepped and draped in the dorsal lithotomy position. A bivalve speculum was placed in the vagina and the cervix was prepped with Betadine solution. Single tooth tenaculum placed at the  o'clock position. The uterine cavity was sounded at 10 + cm. The endometrial cavity was curetted for pipelle tissue sampling, 1   passes. Specimen was sent to pathology. The single tooth tenaculum was removed. Good hemostasis was noted. There were no complications. There was no blood loss. Discharge instructions were provided to the patient. Visit Plan:  will proceed with hysterectomy scheduling. Pt wants to see Dr Jael Uribe @ [facility] for a 2nd opinion on robotic hsyterectomy although already told by Dr Vandana Her that she was not a candidate for it. I informed her that I believe that PATRICIA is best option for her as well but she has the right to seek a 2nd opinion.

## 2023-02-12 ENCOUNTER — HOSPITAL ENCOUNTER (OUTPATIENT)
Dept: ULTRASOUND IMAGING | Age: 53
Discharge: HOME | End: 2023-02-12
Attending: OBSTETRICS & GYNECOLOGY
Payer: COMMERCIAL

## 2023-02-12 ENCOUNTER — HOSPITAL ENCOUNTER (OUTPATIENT)
Dept: ULTRASOUND IMAGING | Age: 53
End: 2023-02-12
Attending: OBSTETRICS & GYNECOLOGY
Payer: COMMERCIAL

## 2023-02-12 DIAGNOSIS — D21.9 FIBROIDS: ICD-10-CM

## 2023-02-12 PROCEDURE — 76830 TRANSVAGINAL US NON-OB: CPT | Performed by: OBSTETRICS & GYNECOLOGY

## 2023-02-12 PROCEDURE — 76856 US EXAM PELVIC COMPLETE: CPT | Performed by: OBSTETRICS & GYNECOLOGY

## 2023-02-17 ENCOUNTER — HOSPITAL ENCOUNTER (OUTPATIENT)
Dept: MAMMOGRAPHY | Age: 53
Discharge: HOME | End: 2023-02-17
Attending: OBSTETRICS & GYNECOLOGY
Payer: COMMERCIAL

## 2023-02-17 DIAGNOSIS — Z12.31 VISIT FOR SCREENING MAMMOGRAM: ICD-10-CM

## 2023-02-17 PROCEDURE — 77067 SCR MAMMO BI INCL CAD: CPT | Performed by: OBSTETRICS & GYNECOLOGY

## 2023-02-17 PROCEDURE — 77063 BREAST TOMOSYNTHESIS BI: CPT | Performed by: OBSTETRICS & GYNECOLOGY

## 2023-02-25 DIAGNOSIS — D50.9 IRON DEFICIENCY ANEMIA, UNSPECIFIED IRON DEFICIENCY ANEMIA TYPE: ICD-10-CM

## 2023-02-25 RX ORDER — MELATONIN
325 2 TIMES DAILY WITH MEALS
Qty: 180 TABLET | Refills: 1 | OUTPATIENT
Start: 2023-02-25 | End: 2023-08-24

## 2023-03-01 ENCOUNTER — TELEPHONE (OUTPATIENT)
Dept: OBGYN CLINIC | Facility: CLINIC | Age: 53
End: 2023-03-01

## 2023-03-03 ENCOUNTER — HOSPITAL ENCOUNTER (OUTPATIENT)
Dept: MAMMOGRAPHY | Facility: HOSPITAL | Age: 53
Discharge: HOME | End: 2023-03-03
Attending: OBSTETRICS & GYNECOLOGY
Payer: COMMERCIAL

## 2023-03-03 DIAGNOSIS — R92.2 INCONCLUSIVE MAMMOGRAM: ICD-10-CM

## 2023-03-03 PROCEDURE — 77062 BREAST TOMOSYNTHESIS BI: CPT | Performed by: OBSTETRICS & GYNECOLOGY

## 2023-03-03 PROCEDURE — 76642 ULTRASOUND BREAST LIMITED: CPT | Performed by: OBSTETRICS & GYNECOLOGY

## 2023-03-03 PROCEDURE — 77066 DX MAMMO INCL CAD BI: CPT | Performed by: OBSTETRICS & GYNECOLOGY

## 2023-03-06 NOTE — TELEPHONE ENCOUNTER
Pt with upcoming PATRICIA on 4/3/23 with questions regarding post-op period. Would you like her to purchase abdominal binder or anything else for home-care post-op? How long should she expect vaginal bleeding post-op? To CAP to advise.

## 2023-03-07 NOTE — TELEPHONE ENCOUNTER
No it is not necessary to buy an abdominal binder- I do not like for these to be used in the immediate postop period because I do not like them rubbing on the abdominal  incision. They do have binders we can order at the hospital for her to go home with if it is needed. Length of time for Vaginal bleeding varies greatly from patient to patient after surgery but it is minimal in most cases if any.

## 2023-03-26 ENCOUNTER — PATIENT MESSAGE (OUTPATIENT)
Dept: OBGYN CLINIC | Facility: CLINIC | Age: 53
End: 2023-03-26

## 2023-03-27 ENCOUNTER — TELEPHONE (OUTPATIENT)
Dept: OBGYN CLINIC | Facility: CLINIC | Age: 53
End: 2023-03-27

## 2023-03-27 NOTE — TELEPHONE ENCOUNTER
From: Edwina Myrick  To: Brit Chew MD  Sent: 3/26/2023 9:19 PM CDT  Subject: Arrival time for hysterectomy     What are the arrival instructions? i.e., time, location etc

## 2023-03-27 NOTE — TELEPHONE ENCOUNTER
Received call from [facility] at [facility] would like to know if pt needs a repeat CBC due to last drawn by PCP on 01/21/2023 was 8.4    States reviewed office note from [address] who recommended CBC prior to surgery due to history of anemia. Also would like to know if  is assisting on case. As of now is reflecting STEPHEN is assisting.     Will Route to CAP for recs

## 2023-03-27 NOTE — TELEPHONE ENCOUNTER
Per verbal conversation with CAP.  to assist, Not NJG.  Will send surgery change request    OK to have CBC done with PAT labs

## 2023-03-31 ENCOUNTER — LAB ENCOUNTER (OUTPATIENT)
Dept: LAB | Facility: HOSPITAL | Age: 53
End: 2023-03-31
Attending: OBSTETRICS & GYNECOLOGY
Payer: COMMERCIAL

## 2023-03-31 ENCOUNTER — PATIENT MESSAGE (OUTPATIENT)
Dept: OBGYN CLINIC | Facility: CLINIC | Age: 53
End: 2023-03-31

## 2023-03-31 DIAGNOSIS — Z01.818 PREOP TESTING: ICD-10-CM

## 2023-03-31 LAB
ANTIBODY SCREEN: NEGATIVE
BASOPHILS # BLD AUTO: 0.01 X10(3) UL (ref 0–0.2)
BASOPHILS NFR BLD AUTO: 0.2 %
DEPRECATED RDW RBC AUTO: 71.8 FL (ref 35.1–46.3)
EOSINOPHIL # BLD AUTO: 0.06 X10(3) UL (ref 0–0.7)
EOSINOPHIL NFR BLD AUTO: 1.3 %
ERYTHROCYTE [DISTWIDTH] IN BLOOD BY AUTOMATED COUNT: 26.2 % (ref 11–15)
HCT VFR BLD AUTO: 38.3 %
HGB BLD-MCNC: 10.4 G/DL
IMM GRANULOCYTES # BLD AUTO: 0.02 X10(3) UL (ref 0–1)
IMM GRANULOCYTES NFR BLD: 0.4 %
LYMPHOCYTES # BLD AUTO: 1.29 X10(3) UL (ref 1–4)
LYMPHOCYTES NFR BLD AUTO: 27.3 %
MCH RBC QN AUTO: 21.5 PG (ref 26–34)
MCHC RBC AUTO-ENTMCNC: 27.2 G/DL (ref 31–37)
MCV RBC AUTO: 79.3 FL
MONOCYTES # BLD AUTO: 0.4 X10(3) UL (ref 0.1–1)
MONOCYTES NFR BLD AUTO: 8.5 %
NEUTROPHILS # BLD AUTO: 2.95 X10 (3) UL (ref 1.5–7.7)
NEUTROPHILS # BLD AUTO: 2.95 X10(3) UL (ref 1.5–7.7)
NEUTROPHILS NFR BLD AUTO: 62.3 %
PLATELET # BLD AUTO: 561 10(3)UL (ref 150–450)
PLATELET MORPHOLOGY: NORMAL
RBC # BLD AUTO: 4.83 X10(6)UL
RH BLOOD TYPE: POSITIVE
WBC # BLD AUTO: 4.7 X10(3) UL (ref 4–11)

## 2023-03-31 PROCEDURE — 85025 COMPLETE CBC W/AUTO DIFF WBC: CPT

## 2023-03-31 PROCEDURE — 36415 COLL VENOUS BLD VENIPUNCTURE: CPT

## 2023-03-31 PROCEDURE — 86900 BLOOD TYPING SEROLOGIC ABO: CPT

## 2023-03-31 PROCEDURE — 86850 RBC ANTIBODY SCREEN: CPT

## 2023-03-31 PROCEDURE — 86901 BLOOD TYPING SEROLOGIC RH(D): CPT

## 2023-03-31 NOTE — TELEPHONE ENCOUNTER
Reviewed message with CAP over the phone. She states surgery will proceed as scheduled. Pt informed via [employer].

## 2023-04-03 ENCOUNTER — HOSPITAL ENCOUNTER (INPATIENT)
Facility: HOSPITAL | Age: 53
LOS: 3 days | Discharge: HOME | End: 2023-04-06
Attending: OBSTETRICS & GYNECOLOGY | Admitting: OBSTETRICS & GYNECOLOGY
Payer: COMMERCIAL

## 2023-04-03 ENCOUNTER — ANESTHESIA (OUTPATIENT)
Dept: SURGERY | Facility: HOSPITAL | Age: 53
End: 2023-04-03
Payer: COMMERCIAL

## 2023-04-03 ENCOUNTER — ANESTHESIA EVENT (OUTPATIENT)
Dept: SURGERY | Facility: HOSPITAL | Age: 53
End: 2023-04-03
Payer: COMMERCIAL

## 2023-04-03 DIAGNOSIS — Z01.818 PREOP TESTING: Primary | ICD-10-CM

## 2023-04-03 DIAGNOSIS — D25.9 UTERINE LEIOMYOMA, UNSPECIFIED LOCATION: ICD-10-CM

## 2023-04-03 PROBLEM — Z90.710 S/P TOTAL ABDOMINAL HYSTERECTOMY: Status: ACTIVE | Noted: 2023-04-03

## 2023-04-03 LAB — B-HCG UR QL: NEGATIVE

## 2023-04-03 PROCEDURE — 0UT90ZZ RESECTION OF UTERUS, OPEN APPROACH: ICD-10-PCS | Performed by: OBSTETRICS & GYNECOLOGY

## 2023-04-03 PROCEDURE — 0UT20ZZ RESECTION OF BILATERAL OVARIES, OPEN APPROACH: ICD-10-PCS | Performed by: OBSTETRICS & GYNECOLOGY

## 2023-04-03 PROCEDURE — 0UT70ZZ RESECTION OF BILATERAL FALLOPIAN TUBES, OPEN APPROACH: ICD-10-PCS | Performed by: OBSTETRICS & GYNECOLOGY

## 2023-04-03 RX ORDER — NEOSTIGMINE METHYLSULFATE 1 MG/ML
INJECTION, SOLUTION INTRAVENOUS AS NEEDED
Status: DISCONTINUED | OUTPATIENT
Start: 2023-04-03 | End: 2023-04-03 | Stop reason: SURG

## 2023-04-03 RX ORDER — MORPHINE SULFATE 10 MG/ML
6 INJECTION, SOLUTION INTRAMUSCULAR; INTRAVENOUS EVERY 10 MIN PRN
Status: DISCONTINUED | OUTPATIENT
Start: 2023-04-03 | End: 2023-04-06

## 2023-04-03 RX ORDER — HYDROMORPHONE HYDROCHLORIDE 1 MG/ML
0.4 INJECTION, SOLUTION INTRAMUSCULAR; INTRAVENOUS; SUBCUTANEOUS EVERY 5 MIN PRN
Status: DISCONTINUED | OUTPATIENT
Start: 2023-04-03 | End: 2023-04-06

## 2023-04-03 RX ORDER — ONDANSETRON 2 MG/ML
INJECTION INTRAMUSCULAR; INTRAVENOUS AS NEEDED
Status: DISCONTINUED | OUTPATIENT
Start: 2023-04-03 | End: 2023-04-03 | Stop reason: SURG

## 2023-04-03 RX ORDER — HYDROMORPHONE HYDROCHLORIDE 1 MG/ML
0.2 INJECTION, SOLUTION INTRAMUSCULAR; INTRAVENOUS; SUBCUTANEOUS EVERY 5 MIN PRN
Status: DISCONTINUED | OUTPATIENT
Start: 2023-04-03 | End: 2023-04-06

## 2023-04-03 RX ORDER — DEXTROSE, SODIUM CHLORIDE, SODIUM LACTATE, POTASSIUM CHLORIDE, AND CALCIUM CHLORIDE 5; .6; .31; .03; .02 G/100ML; G/100ML; G/100ML; G/100ML; G/100ML
INJECTION, SOLUTION INTRAVENOUS CONTINUOUS
Status: DISCONTINUED | OUTPATIENT
Start: 2023-04-03 | End: 2023-04-05

## 2023-04-03 RX ORDER — ENOXAPARIN SODIUM 100 MG/ML
40 INJECTION SUBCUTANEOUS DAILY
Status: DISCONTINUED | OUTPATIENT
Start: 2023-04-03 | End: 2023-04-06

## 2023-04-03 RX ORDER — ACETAMINOPHEN 500 MG
1000 TABLET ORAL ONCE
Status: COMPLETED | OUTPATIENT
Start: 2023-04-03 | End: 2023-04-03

## 2023-04-03 RX ORDER — FAMOTIDINE 20 MG/1
20 TABLET, FILM COATED ORAL ONCE
Status: COMPLETED | OUTPATIENT
Start: 2023-04-03 | End: 2023-04-03

## 2023-04-03 RX ORDER — MORPHINE SULFATE 4 MG/ML
4 INJECTION, SOLUTION INTRAMUSCULAR; INTRAVENOUS EVERY 10 MIN PRN
Status: DISCONTINUED | OUTPATIENT
Start: 2023-04-03 | End: 2023-04-06

## 2023-04-03 RX ORDER — GABAPENTIN 300 MG/1
300 CAPSULE ORAL 3 TIMES DAILY
Status: DISCONTINUED | OUTPATIENT
Start: 2023-04-03 | End: 2023-04-06

## 2023-04-03 RX ORDER — OXYCODONE HYDROCHLORIDE 5 MG/1
5 TABLET ORAL EVERY 4 HOURS PRN
Status: DISCONTINUED | OUTPATIENT
Start: 2023-04-03 | End: 2023-04-06

## 2023-04-03 RX ORDER — ONDANSETRON 2 MG/ML
4 INJECTION INTRAMUSCULAR; INTRAVENOUS EVERY 8 HOURS PRN
Status: DISCONTINUED | OUTPATIENT
Start: 2023-04-03 | End: 2023-04-06

## 2023-04-03 RX ORDER — HYDROMORPHONE HYDROCHLORIDE 1 MG/ML
0.8 INJECTION, SOLUTION INTRAMUSCULAR; INTRAVENOUS; SUBCUTANEOUS EVERY 2 HOUR PRN
Status: DISCONTINUED | OUTPATIENT
Start: 2023-04-03 | End: 2023-04-06

## 2023-04-03 RX ORDER — NALOXONE HYDROCHLORIDE 0.4 MG/ML
80 INJECTION, SOLUTION INTRAMUSCULAR; INTRAVENOUS; SUBCUTANEOUS AS NEEDED
Status: ACTIVE | OUTPATIENT
Start: 2023-04-03 | End: 2023-04-03

## 2023-04-03 RX ORDER — CEFAZOLIN SODIUM/WATER 2 G/20 ML
2 SYRINGE (ML) INTRAVENOUS ONCE
Status: COMPLETED | OUTPATIENT
Start: 2023-04-03 | End: 2023-04-03

## 2023-04-03 RX ORDER — ONDANSETRON 2 MG/ML
4 INJECTION INTRAMUSCULAR; INTRAVENOUS EVERY 6 HOURS PRN
Status: DISCONTINUED | OUTPATIENT
Start: 2023-04-03 | End: 2023-04-06

## 2023-04-03 RX ORDER — PROCHLORPERAZINE EDISYLATE 5 MG/ML
5 INJECTION INTRAMUSCULAR; INTRAVENOUS EVERY 8 HOURS PRN
Status: DISCONTINUED | OUTPATIENT
Start: 2023-04-03 | End: 2023-04-06

## 2023-04-03 RX ORDER — SODIUM CHLORIDE, SODIUM LACTATE, POTASSIUM CHLORIDE, CALCIUM CHLORIDE 600; 310; 30; 20 MG/100ML; MG/100ML; MG/100ML; MG/100ML
INJECTION, SOLUTION INTRAVENOUS CONTINUOUS
Status: DISCONTINUED | OUTPATIENT
Start: 2023-04-03 | End: 2023-04-05

## 2023-04-03 RX ORDER — ROCURONIUM BROMIDE 10 MG/ML
INJECTION, SOLUTION INTRAVENOUS AS NEEDED
Status: DISCONTINUED | OUTPATIENT
Start: 2023-04-03 | End: 2023-04-03 | Stop reason: SURG

## 2023-04-03 RX ORDER — PHENYLEPHRINE HCL 10 MG/ML
VIAL (ML) INJECTION AS NEEDED
Status: DISCONTINUED | OUTPATIENT
Start: 2023-04-03 | End: 2023-04-03 | Stop reason: SURG

## 2023-04-03 RX ORDER — METOCLOPRAMIDE 10 MG/1
10 TABLET ORAL ONCE
Status: COMPLETED | OUTPATIENT
Start: 2023-04-03 | End: 2023-04-03

## 2023-04-03 RX ORDER — ONDANSETRON 4 MG/1
4 TABLET, FILM COATED ORAL EVERY 8 HOURS PRN
Status: DISCONTINUED | OUTPATIENT
Start: 2023-04-03 | End: 2023-04-06

## 2023-04-03 RX ORDER — DEXAMETHASONE SODIUM PHOSPHATE 4 MG/ML
VIAL (ML) INJECTION AS NEEDED
Status: DISCONTINUED | OUTPATIENT
Start: 2023-04-03 | End: 2023-04-03 | Stop reason: SURG

## 2023-04-03 RX ORDER — HYDROMORPHONE HYDROCHLORIDE 1 MG/ML
0.4 INJECTION, SOLUTION INTRAMUSCULAR; INTRAVENOUS; SUBCUTANEOUS EVERY 2 HOUR PRN
Status: DISCONTINUED | OUTPATIENT
Start: 2023-04-03 | End: 2023-04-06

## 2023-04-03 RX ORDER — LIDOCAINE HYDROCHLORIDE 10 MG/ML
INJECTION, SOLUTION EPIDURAL; INFILTRATION; INTRACAUDAL; PERINEURAL AS NEEDED
Status: DISCONTINUED | OUTPATIENT
Start: 2023-04-03 | End: 2023-04-03 | Stop reason: SURG

## 2023-04-03 RX ORDER — MIDAZOLAM HYDROCHLORIDE 1 MG/ML
INJECTION INTRAMUSCULAR; INTRAVENOUS AS NEEDED
Status: DISCONTINUED | OUTPATIENT
Start: 2023-04-03 | End: 2023-04-03 | Stop reason: SURG

## 2023-04-03 RX ORDER — OXYCODONE HYDROCHLORIDE 5 MG/1
10 TABLET ORAL EVERY 4 HOURS PRN
Status: DISCONTINUED | OUTPATIENT
Start: 2023-04-03 | End: 2023-04-06

## 2023-04-03 RX ORDER — GLYCOPYRROLATE 0.2 MG/ML
INJECTION, SOLUTION INTRAMUSCULAR; INTRAVENOUS AS NEEDED
Status: DISCONTINUED | OUTPATIENT
Start: 2023-04-03 | End: 2023-04-03 | Stop reason: SURG

## 2023-04-03 RX ORDER — MORPHINE SULFATE 4 MG/ML
2 INJECTION, SOLUTION INTRAMUSCULAR; INTRAVENOUS EVERY 10 MIN PRN
Status: DISCONTINUED | OUTPATIENT
Start: 2023-04-03 | End: 2023-04-06

## 2023-04-03 RX ORDER — HYDROMORPHONE HYDROCHLORIDE 1 MG/ML
0.6 INJECTION, SOLUTION INTRAMUSCULAR; INTRAVENOUS; SUBCUTANEOUS EVERY 5 MIN PRN
Status: DISCONTINUED | OUTPATIENT
Start: 2023-04-03 | End: 2023-04-06

## 2023-04-03 RX ADMIN — ONDANSETRON 4 MG: 2 INJECTION INTRAMUSCULAR; INTRAVENOUS at 08:44:00

## 2023-04-03 RX ADMIN — ROCURONIUM BROMIDE 20 MG: 10 INJECTION, SOLUTION INTRAVENOUS at 08:44:00

## 2023-04-03 RX ADMIN — NEOSTIGMINE METHYLSULFATE 5 MG: 1 INJECTION, SOLUTION INTRAVENOUS at 09:38:00

## 2023-04-03 RX ADMIN — PHENYLEPHRINE HCL 100 MCG: 10 MG/ML VIAL (ML) INJECTION at 08:25:00

## 2023-04-03 RX ADMIN — ROCURONIUM BROMIDE 50 MG: 10 INJECTION, SOLUTION INTRAVENOUS at 07:55:00

## 2023-04-03 RX ADMIN — SODIUM CHLORIDE, SODIUM LACTATE, POTASSIUM CHLORIDE, CALCIUM CHLORIDE: 600; 310; 30; 20 INJECTION, SOLUTION INTRAVENOUS at 07:55:00

## 2023-04-03 RX ADMIN — GLYCOPYRROLATE 0.8 MG: 0.2 INJECTION, SOLUTION INTRAMUSCULAR; INTRAVENOUS at 09:38:00

## 2023-04-03 RX ADMIN — DEXAMETHASONE SODIUM PHOSPHATE 4 MG: 4 MG/ML VIAL (ML) INJECTION at 08:44:00

## 2023-04-03 RX ADMIN — MIDAZOLAM HYDROCHLORIDE 2 MG: 1 INJECTION INTRAMUSCULAR; INTRAVENOUS at 07:55:00

## 2023-04-03 RX ADMIN — LIDOCAINE HYDROCHLORIDE 100 MG: 10 INJECTION, SOLUTION EPIDURAL; INFILTRATION; INTRACAUDAL; PERINEURAL at 09:29:00

## 2023-04-03 RX ADMIN — PHENYLEPHRINE HCL 100 MCG: 10 MG/ML VIAL (ML) INJECTION at 08:01:00

## 2023-04-03 RX ADMIN — CEFAZOLIN SODIUM/WATER 2 G: 2 G/20 ML SYRINGE (ML) INTRAVENOUS at 07:55:00

## 2023-04-03 NOTE — OPERATIVE REPORT
Abdominal Hysterectomy Operative Note    Patient Name: Edwina Myrick    Preoperative Diagnosis: Uterine leiomyoma, unspecified location [D25.9], symptomatic uterine fibroid    Postoperative Diagnosis: Uterine leiomyoma, unspecified location [D25.9], symptomatic uterine fibroid, pelvic adhesions    Primary Surgeon: Tammi Casiano. MD Naina     Co-surgeon:  Sugar Masters MD    Procedures: Total Abdominal Hysterectomy with Bilateral Salpingo-ophorectomy, lysis of adhesions    Surgical Findings: large fibroid uterus,large left ovarian simple cyst, adhesions of bowel to uterus and adhesions of left adnexa to uterus. Ureters located and not dilated. Anesthesia: General    Complications: none    Specimen: uterus, cervix, bilateral fallopian tubes    Condition: stable    Estimated Blood Loss: 200 cc    Urine Output[de-identified]  100 cc    Procedure Details    Pt was seen in preoperative area where procedure was discussed detailing the risks and benefits of ovarian preservation vs oophorectomy. Pt opted for oophorectomy since she had already undergone menopause. Consent was updated to include  bilateral oophorectomy. The patient was taken to the operating room, identified as Edwina Myrick and the procedure was identified as Total Abdominal Hysterectomy with Bilateral Salpingoophorectomy. After induction of anesthesia, the patient was draped and prepped in the usual sterile manner. The patient was placed in supine position and draped and prepped in the usual sterile fashion. A silva catheter was placed without difficulty. A Maylard incision was made due to the markedly enlarged uterus and this portion of the procedure was performed by and will be dictated by co-surgeon Dr Bar Gutiérrez. The pelvis was examined and the above findings were noted. The uterus was delivered into the operative field. Metzenbaum scissors were then used to carefully lyse the bowel adhesions to the uterus and the adnexal adhesions to the uterus.    A O'Gilmer-Larry retractor was placed and bowel was packed away from the surgical site using moist laparatomy sponges when pt placed in trendelenberg. .     The round ligaments were identified, clamped, cut, and suture ligated with 0-Vicryl. The anterior peritoneal reflection was incised and the bladder was dissected off the lower uterine segment and cervix. The retroperitoneal space was explored and the ureters were identified bilaterally. Dr Ana Mccullough then proceeded to perform bilateral salpingo-ophorectomy and he will dictate this portion of the procedure separately. The uterine vessels were skeletonized, doubly clamped with Brittany clamps, cut and suture ligated with 0-Vicryl suture. Hemostasis was again assured. Serial pedicles of the cardinal and uterosacral ligaments were clamped, cut, and suture ligated with 0-Vicryl. The cervix and uterus were then amputated sharply with Kent scissors and sent off the surgical field. The vaginal cuff angles were closed with figure of eight sutures using 0-Vicryl incorporating the uterosacral ligaments for support. The remainder of the vaginal cuff was then closed with 0-Vicryl in a interrupted fashion. The cuff and cul-de-sac were then copiously irrigated and good hemostasis was observed. The retractor and all packing were removed from the abdomen. The fascia was re-approximated with running sutures of 0-PDS incorporating the rectus muscle in the closure. The subcutaneous tissue was irrigated and good hemostasis was observed. The skin was re-approximated with subcuticular sutures of 4-0 Vicryl. Instrument, sponge, and needle counts were correct prior to abdominal closure. Ynes Chew MD  4/3/2023  1:46 PM

## 2023-04-03 NOTE — ANESTHESIA PROCEDURE NOTES
Airway  Date/Time: 4/3/2023 7:54 AM  Urgency: Elective    Airway not difficult    General Information and Staff    Patient location during procedure: OR  Anesthesiologist: Tono Campos MD  Performed: anesthesiologist   Performed by: Tono Campos MD  Authorized by: Tono Campos MD      Indications and Patient Condition  Indications for airway management: anesthesia  Sedation level: deep  Preoxygenated: yes  Patient position: sniffing  Mask difficulty assessment: 1 - vent by mask    Final Airway Details  Final airway type: endotracheal airway      Successful airway: ETT  Cuffed: yes   Successful intubation technique: direct laryngoscopy  Endotracheal tube insertion site: oral    Placement verified by: chest auscultation and capnometry   Measured from: teeth  Number of attempts at approach: 1

## 2023-04-03 NOTE — ANESTHESIA POSTPROCEDURE EVALUATION
Patient: Meli Abreu    Procedure Summary     Date: 04/03/23 Room / Location: [address] [place] [address] MAIN OR 12 / [address] [place] [address] MAIN OR    Anesthesia Start: [address] Anesthesia Stop:     Procedure: Total abdominal hysterectomy- bilateral salpingectomy (Abdomen) Diagnosis:       Uterine leiomyoma, unspecified location      (Uterine leiomyoma, unspecified location [D25.9], symptomatic uterine fibroid)    Surgeons: Leigh Hernandez MD Anesthesiologist: Cleda Dancer, MD    Anesthesia Type: general ASA Status: 2          Anesthesia Type: general    Vitals Value Taken Time   /73 04/03/23 0956   Temp 98 04/03/23 0956   Pulse 59 04/03/23 0955   Resp 20 04/03/23 0955   SpO2 93 % 04/03/23 0955   Vitals shown include unvalidated device data.     [address] [place] [address] AN Post Evaluation:   Patient Evaluated in PACU  Patient Participation: complete - patient participated  Level of Consciousness: awake  Pain Management: adequate  Airway Patency:patent  Dental exam unchanged from preop  Yes    Cardiovascular Status: acceptable  Respiratory Status: acceptable  Postoperative Hydration acceptable      Briana Almonte MD  4/3/2023 9:56 AM

## 2023-04-03 NOTE — BRIEF OP NOTE
Pre-Operative Diagnosis: Uterine leiomyoma, unspecified location [D25.9], symptomatic uterine fibroid     Post-Operative Diagnosis: Uterine leiomyoma, unspecified location [D25.9], symptomatic uterine fibroid      Procedure Performed: Total Abdominal hysterectomy, Bilateral salpingo-ophorectomy, lysis of adesions, maylard incision  Surgeon(s) and Role:     * José Miguel Jackson MD - Co-surgeon     * Ruiz Olivares MD - Co- Surgeon     Surgical Findings: enlarged fibroid uterus with bowel,bladder and adnexal adhesions to the ovary. Specimen: uterus, cervix,tubes and ovaries     Estimated Blood Loss: 200cc    Urine Output:  100cc    Ynes Chew MD  4/3/2023  9:51 AM

## 2023-04-04 LAB
BASOPHILS # BLD AUTO: 0.02 X10(3) UL (ref 0–0.2)
BASOPHILS NFR BLD AUTO: 0.3 %
DEPRECATED RDW RBC AUTO: 73.7 FL (ref 35.1–46.3)
EOSINOPHIL # BLD AUTO: 0 X10(3) UL (ref 0–0.7)
EOSINOPHIL NFR BLD AUTO: 0 %
ERYTHROCYTE [DISTWIDTH] IN BLOOD BY AUTOMATED COUNT: 26.5 % (ref 11–15)
HCT VFR BLD AUTO: 39.6 %
HGB BLD-MCNC: 11 G/DL
IMM GRANULOCYTES # BLD AUTO: 0.03 X10(3) UL (ref 0–1)
IMM GRANULOCYTES NFR BLD: 0.4 %
LYMPHOCYTES # BLD AUTO: 1.39 X10(3) UL (ref 1–4)
LYMPHOCYTES NFR BLD AUTO: 19.4 %
MCH RBC QN AUTO: 22 PG (ref 26–34)
MCHC RBC AUTO-ENTMCNC: 27.8 G/DL (ref 31–37)
MCV RBC AUTO: 79.4 FL
MONOCYTES # BLD AUTO: 0.46 X10(3) UL (ref 0.1–1)
MONOCYTES NFR BLD AUTO: 6.4 %
NEUTROPHILS # BLD AUTO: 5.28 X10 (3) UL (ref 1.5–7.7)
NEUTROPHILS # BLD AUTO: 5.28 X10(3) UL (ref 1.5–7.7)
NEUTROPHILS NFR BLD AUTO: 73.5 %
PLATELET # BLD AUTO: 455 10(3)UL (ref 150–450)
RBC # BLD AUTO: 4.99 X10(6)UL
WBC # BLD AUTO: 7.2 X10(3) UL (ref 4–11)

## 2023-04-04 RX ORDER — SODIUM PHOSPHATE, DIBASIC AND SODIUM PHOSPHATE, MONOBASIC 7; 19 G/133ML; G/133ML
1 ENEMA RECTAL ONCE AS NEEDED
Status: DISCONTINUED | OUTPATIENT
Start: 2023-04-04 | End: 2023-04-06

## 2023-04-04 RX ORDER — POLYETHYLENE GLYCOL 3350 17 G/17G
17 POWDER, FOR SOLUTION ORAL DAILY PRN
Status: DISCONTINUED | OUTPATIENT
Start: 2023-04-04 | End: 2023-04-06

## 2023-04-04 RX ORDER — BISACODYL 10 MG
10 SUPPOSITORY, RECTAL RECTAL
Status: DISCONTINUED | OUTPATIENT
Start: 2023-04-04 | End: 2023-04-06

## 2023-04-04 RX ORDER — DOCUSATE SODIUM 100 MG/1
100 CAPSULE, LIQUID FILLED ORAL 2 TIMES DAILY
Status: DISCONTINUED | OUTPATIENT
Start: 2023-04-04 | End: 2023-04-06

## 2023-04-05 PROBLEM — R03.0 ELEVATED BLOOD PRESSURE, SITUATIONAL: Status: ACTIVE | Noted: 2019-09-16

## 2023-04-05 PROBLEM — Z01.818 PREOP TESTING: Status: ACTIVE | Noted: 2019-06-06

## 2023-04-05 PROCEDURE — 99222 1ST HOSP IP/OBS MODERATE 55: CPT | Performed by: HOSPITALIST

## 2023-04-05 RX ORDER — HYDROCODONE BITARTRATE AND ACETAMINOPHEN 5; 325 MG/1; MG/1
1 TABLET ORAL EVERY 8 HOURS PRN
Qty: 20 TABLET | Refills: 0 | Status: SHIPPED | OUTPATIENT
Start: 2023-04-05 | End: 2023-04-06

## 2023-04-05 RX ORDER — IBUPROFEN 600 MG/1
600 TABLET ORAL EVERY 6 HOURS PRN
Qty: 30 TABLET | Refills: 0 | Status: SHIPPED | OUTPATIENT
Start: 2023-04-05 | End: 2023-04-06

## 2023-04-05 NOTE — DISCHARGE SUMMARY
[facility]    Discharge Summary    [facility] Patient Status:  Inpatient    8/10/1970 MRN [id number]   Location [facility] 4W/SW/SE Attending Shaune Prader, MD   Hosp Day # 2 PCP Molina Cross DO     Date of Admission: 4/3/2023   Date of Discharge: 2023    Admitting Diagnosis: Uterine leiomyoma, unspecified location [D25.9], symptomatic uterine fibroid  S/P total abdominal hysterectomy    Disposition: Home and Self - care    Discharge Diagnosis: . Active Problems:    Preop testing    Severe anemia    Intramural leiomyoma of uterus    Elevated blood pressure, situational    Menorrhagia with regular cycle    S/P total abdominal hysterectomy      Hospital Course:   Reason for Admission: abdominal hysterectomy      Hospital Course: pt did well from surgical standpoint but BP's were in the hypertensive range and pt not on meds therefore hospitalist was called to evaluate, pt discharged home in stable condition to f/u with pcp to address BP and to keep BP log. Complications: None      Surgical Procedures     Case IDs Date Procedure Surgeon Location Status    [id number] 4/3/23 Total abdominal hysterectomy- bilateral salpingoophrectomy Shaune Prader,  [facility] Comp            Discharge Plan:   Discharge Condition: Good    Current Discharge Medication List    Home Meds - Unchanged    ferrous sulfate 325 (65 FE) MG Oral Tab EC  Take 1 tablet (325 mg total) by mouth 2 (two) times daily with meals. Discharge Diet: As tolerated and General diet    Discharge Activity: As tolerated, No Driving until cleared, no tub baths and nothing per vagina for 8 weeks and May shower    Follow up:       6 weeks for post op visit  See pcp for BP follow up, keep BP log        Ynes Chew MD  2023

## 2023-04-05 NOTE — SPIRITUAL CARE NOTE
Spiritual Care Visit Note    Visited With: Patient    Spiritual Care Taxonomy:    Intended Effects: Establish rapport and connectedness    Methods: Collaborate with care team member;Offer support    Interventions: Active listening; Ask guided questions;Silent prayer    RN report patient open to visit. No one at bedside. Writer report offering empathic listening and support. Patient report being thankful for her doctors and care received. Patient expressed her love and dependences on the [employer]. Patient has good support system. Writer entered into [employer]. Patient open to dialog and writer left information with RN to give to patient. Follow up as requested.     [address], [address] [facility]   [id number]     On call  services [phone]

## 2023-04-05 NOTE — DISCHARGE INSTRUCTIONS
No Driving until cleared. No tub baths and nothing per vagina for 8 weeks. Okay to shower. Do not take tylenol and norco at the same time. Take tylenol only when not taking norco.     Take blood pressure daily and keep a log to take to PCP follow up.

## 2023-04-06 ENCOUNTER — PATIENT OUTREACH (OUTPATIENT)
Dept: CASE MANAGEMENT | Age: 53
End: 2023-04-06

## 2023-04-06 VITALS
BODY MASS INDEX: 39.16 KG/M2 | RESPIRATION RATE: 18 BRPM | SYSTOLIC BLOOD PRESSURE: 154 MMHG | TEMPERATURE: 97 F | HEART RATE: 78 BPM | WEIGHT: 221 LBS | OXYGEN SATURATION: 92 % | DIASTOLIC BLOOD PRESSURE: 81 MMHG | HEIGHT: 63 IN

## 2023-04-06 PROCEDURE — 99232 SBSQ HOSP IP/OBS MODERATE 35: CPT | Performed by: HOSPITALIST

## 2023-04-06 RX ORDER — HYDROCODONE BITARTRATE AND ACETAMINOPHEN 5; 325 MG/1; MG/1
1 TABLET ORAL EVERY 8 HOURS PRN
Qty: 10 TABLET | Refills: 0 | Status: SHIPPED | OUTPATIENT
Start: 2023-04-06 | End: 2023-04-06

## 2023-04-06 RX ORDER — HYDROCODONE BITARTRATE AND ACETAMINOPHEN 5; 325 MG/1; MG/1
1 TABLET ORAL EVERY 8 HOURS PRN
Qty: 10 TABLET | Refills: 0 | Status: SHIPPED | OUTPATIENT
Start: 2023-04-06

## 2023-04-06 RX ORDER — IBUPROFEN 600 MG/1
600 TABLET ORAL EVERY 6 HOURS PRN
Qty: 30 TABLET | Refills: 0 | Status: SHIPPED | OUTPATIENT
Start: 2023-04-06

## 2023-04-18 ENCOUNTER — TELEPHONE (OUTPATIENT)
Dept: OBGYN CLINIC | Facility: CLINIC | Age: 53
End: 2023-04-18

## 2023-04-18 NOTE — TELEPHONE ENCOUNTER
Usually this is when we would discuss possible HRT but I do not prescribe this in the immediate post op period due to risks of DVT- we can discuss whether she wants to start HRT at her postop visit after we have had a discussion of the risks,benefits and possible complications of this treatment. For now she can try taking OTC magnesium at night to relax her muscles and sleep in a cool dark room at night.

## 2023-04-20 ENCOUNTER — OFFICE VISIT (OUTPATIENT)
Dept: FAMILY MEDICINE CLINIC | Facility: CLINIC | Age: 53
End: 2023-04-20

## 2023-04-20 VITALS
DIASTOLIC BLOOD PRESSURE: 90 MMHG | SYSTOLIC BLOOD PRESSURE: 140 MMHG | HEIGHT: 63 IN | BODY MASS INDEX: 38.8 KG/M2 | TEMPERATURE: 98 F | WEIGHT: 219 LBS

## 2023-04-20 DIAGNOSIS — Z90.722 S/P TOTAL ABDOMINAL HYSTERECTOMY AND BILATERAL SALPINGO-OOPHORECTOMY: ICD-10-CM

## 2023-04-20 DIAGNOSIS — R39.9 SYMPTOMS INVOLVING URINARY SYSTEM: Primary | ICD-10-CM

## 2023-04-20 DIAGNOSIS — Z90.79 S/P TOTAL ABDOMINAL HYSTERECTOMY AND BILATERAL SALPINGO-OOPHORECTOMY: ICD-10-CM

## 2023-04-20 DIAGNOSIS — R03.0 ELEVATED BLOOD PRESSURE READING: ICD-10-CM

## 2023-04-20 DIAGNOSIS — Z90.710 S/P TOTAL ABDOMINAL HYSTERECTOMY AND BILATERAL SALPINGO-OOPHORECTOMY: ICD-10-CM

## 2023-04-20 LAB
BILIRUB UR QL: NEGATIVE
CLARITY UR: CLEAR
GLUCOSE UR-MCNC: NORMAL MG/DL
HGB UR QL STRIP.AUTO: NEGATIVE
KETONES UR-MCNC: NEGATIVE MG/DL
LEUKOCYTE ESTERASE UR QL STRIP.AUTO: NEGATIVE
NITRITE UR QL STRIP.AUTO: NEGATIVE
PH UR: 6.5 [PH] (ref 5–8)
PROT UR-MCNC: NEGATIVE MG/DL
SP GR UR STRIP: 1.01 (ref 1–1.03)
UROBILINOGEN UR STRIP-ACNC: NORMAL

## 2023-04-20 PROCEDURE — 99214 OFFICE O/P EST MOD 30 MIN: CPT | Performed by: FAMILY MEDICINE

## 2023-04-20 PROCEDURE — 3008F BODY MASS INDEX DOCD: CPT | Performed by: FAMILY MEDICINE

## 2023-04-20 PROCEDURE — 3080F DIAST BP >= 90 MM HG: CPT | Performed by: FAMILY MEDICINE

## 2023-04-20 PROCEDURE — 3077F SYST BP >= 140 MM HG: CPT | Performed by: FAMILY MEDICINE

## 2023-04-20 RX ORDER — IBUPROFEN 600 MG/1
600 TABLET ORAL EVERY 6 HOURS PRN
Qty: 30 TABLET | Refills: 0 | Status: SHIPPED | OUTPATIENT
Start: 2023-04-20

## 2023-04-20 NOTE — PATIENT INSTRUCTIONS
Pain management via prescription medications as needed. Recommend weight loss via daily exercising and consistent healthy dietary changes. Encouraged physical fitness and daily physical activity daily. Comply with medications. Keep follow up appointments with gynecologist performing the surgery. Patient has been given a recommendation to take 2 apple cider vinegar Gummies per day. CBC to be repeated along with an A1c in 4 to 6 weeks.

## 2023-04-21 ENCOUNTER — PATIENT MESSAGE (OUTPATIENT)
Dept: OBGYN CLINIC | Facility: CLINIC | Age: 53
End: 2023-04-21

## 2023-05-15 ENCOUNTER — OFFICE VISIT (OUTPATIENT)
Dept: OBGYN CLINIC | Facility: CLINIC | Age: 53
End: 2023-05-15

## 2023-05-15 VITALS
SYSTOLIC BLOOD PRESSURE: 161 MMHG | DIASTOLIC BLOOD PRESSURE: 89 MMHG | WEIGHT: 222 LBS | BODY MASS INDEX: 39 KG/M2 | HEART RATE: 91 BPM

## 2023-05-15 DIAGNOSIS — Z09 POSTOP CHECK: Primary | ICD-10-CM

## 2023-08-16 ENCOUNTER — HOSPITAL ENCOUNTER (EMERGENCY)
Facility: HOSPITAL | Age: 53
Discharge: HOME | End: 2023-08-16
Attending: EMERGENCY MEDICINE
Payer: COMMERCIAL

## 2023-08-16 ENCOUNTER — APPOINTMENT (OUTPATIENT)
Dept: CT IMAGING | Facility: HOSPITAL | Age: 53
End: 2023-08-16
Attending: EMERGENCY MEDICINE
Payer: COMMERCIAL

## 2023-08-16 VITALS
HEART RATE: 68 BPM | WEIGHT: 215 LBS | HEIGHT: 62 IN | DIASTOLIC BLOOD PRESSURE: 77 MMHG | TEMPERATURE: 98 F | SYSTOLIC BLOOD PRESSURE: 133 MMHG | BODY MASS INDEX: 39.56 KG/M2 | OXYGEN SATURATION: 98 % | RESPIRATION RATE: 17 BRPM

## 2023-08-16 DIAGNOSIS — R03.0 ELEVATED BP WITHOUT DIAGNOSIS OF HYPERTENSION: ICD-10-CM

## 2023-08-16 DIAGNOSIS — R51.9 ACUTE NONINTRACTABLE HEADACHE, UNSPECIFIED HEADACHE TYPE: Primary | ICD-10-CM

## 2023-08-16 LAB
ALBUMIN SERPL-MCNC: 2.9 G/DL (ref 3.4–5)
ALP LIVER SERPL-CCNC: 83 U/L
ALT SERPL-CCNC: 16 U/L
ANION GAP SERPL CALC-SCNC: 2 MMOL/L (ref 0–18)
AST SERPL-CCNC: 17 U/L (ref 15–37)
BASOPHILS # BLD AUTO: 0.01 X10(3) UL (ref 0–0.2)
BASOPHILS NFR BLD AUTO: 0.2 %
BILIRUB DIRECT SERPL-MCNC: <0.1 MG/DL (ref 0–0.2)
BILIRUB SERPL-MCNC: 0.4 MG/DL (ref 0.1–2)
BUN BLD-MCNC: 13 MG/DL (ref 7–18)
BUN/CREAT SERPL: 10.9 (ref 10–20)
CALCIUM BLD-MCNC: 9.4 MG/DL (ref 8.5–10.1)
CHLORIDE SERPL-SCNC: 105 MMOL/L (ref 98–112)
CO2 SERPL-SCNC: 31 MMOL/L (ref 21–32)
CREAT BLD-MCNC: 1.19 MG/DL
DEPRECATED RDW RBC AUTO: 58.7 FL (ref 35.1–46.3)
EGFRCR SERPLBLD CKD-EPI 2021: 54 ML/MIN/1.73M2 (ref 60–?)
EOSINOPHIL # BLD AUTO: 0.05 X10(3) UL (ref 0–0.7)
EOSINOPHIL NFR BLD AUTO: 0.9 %
ERYTHROCYTE [DISTWIDTH] IN BLOOD BY AUTOMATED COUNT: 17.9 % (ref 11–15)
GLUCOSE BLD-MCNC: 92 MG/DL (ref 70–99)
HCT VFR BLD AUTO: 41.5 %
HGB BLD-MCNC: 12.7 G/DL
IMM GRANULOCYTES # BLD AUTO: 0.02 X10(3) UL (ref 0–1)
IMM GRANULOCYTES NFR BLD: 0.3 %
LYMPHOCYTES # BLD AUTO: 1.62 X10(3) UL (ref 1–4)
LYMPHOCYTES NFR BLD AUTO: 28.3 %
MCH RBC QN AUTO: 27.5 PG (ref 26–34)
MCHC RBC AUTO-ENTMCNC: 30.6 G/DL (ref 31–37)
MCV RBC AUTO: 90 FL
MONOCYTES # BLD AUTO: 0.37 X10(3) UL (ref 0.1–1)
MONOCYTES NFR BLD AUTO: 6.5 %
NEUTROPHILS # BLD AUTO: 3.66 X10 (3) UL (ref 1.5–7.7)
NEUTROPHILS # BLD AUTO: 3.66 X10(3) UL (ref 1.5–7.7)
NEUTROPHILS NFR BLD AUTO: 63.8 %
OSMOLALITY SERPL CALC.SUM OF ELEC: 286 MOSM/KG (ref 275–295)
PLATELET # BLD AUTO: 373 10(3)UL (ref 150–450)
POTASSIUM SERPL-SCNC: 4.6 MMOL/L (ref 3.5–5.1)
PROT SERPL-MCNC: 7.7 G/DL (ref 6.4–8.2)
RBC # BLD AUTO: 4.61 X10(6)UL
SODIUM SERPL-SCNC: 138 MMOL/L (ref 136–145)
WBC # BLD AUTO: 5.7 X10(3) UL (ref 4–11)

## 2023-08-16 PROCEDURE — 99285 EMERGENCY DEPT VISIT HI MDM: CPT

## 2023-08-16 PROCEDURE — 96374 THER/PROPH/DIAG INJ IV PUSH: CPT

## 2023-08-16 PROCEDURE — 85025 COMPLETE CBC W/AUTO DIFF WBC: CPT | Performed by: EMERGENCY MEDICINE

## 2023-08-16 PROCEDURE — 70450 CT HEAD/BRAIN W/O DYE: CPT | Performed by: EMERGENCY MEDICINE

## 2023-08-16 PROCEDURE — 80076 HEPATIC FUNCTION PANEL: CPT | Performed by: EMERGENCY MEDICINE

## 2023-08-16 PROCEDURE — 80048 BASIC METABOLIC PNL TOTAL CA: CPT | Performed by: EMERGENCY MEDICINE

## 2023-08-16 PROCEDURE — 99284 EMERGENCY DEPT VISIT MOD MDM: CPT

## 2023-08-16 RX ORDER — KETOROLAC TROMETHAMINE 15 MG/ML
15 INJECTION, SOLUTION INTRAMUSCULAR; INTRAVENOUS ONCE
Status: COMPLETED | OUTPATIENT
Start: 2023-08-16 | End: 2023-08-16

## 2023-08-16 NOTE — ED INITIAL ASSESSMENT (HPI)
Pt presents to the ER with c/o HA since last night. Pt also reports B/p reading  of 178/112 at work this morning.

## 2023-09-26 ENCOUNTER — HOSPITAL ENCOUNTER (EMERGENCY)
Facility: HOSPITAL | Age: 53
Discharge: HOME | End: 2023-09-26
Attending: EMERGENCY MEDICINE

## 2023-09-26 VITALS
HEART RATE: 80 BPM | BODY MASS INDEX: 43.05 KG/M2 | WEIGHT: 242.94 LBS | SYSTOLIC BLOOD PRESSURE: 161 MMHG | OXYGEN SATURATION: 98 % | TEMPERATURE: 98 F | DIASTOLIC BLOOD PRESSURE: 92 MMHG | RESPIRATION RATE: 18 BRPM | HEIGHT: 63 IN

## 2023-09-26 DIAGNOSIS — I10 PRIMARY HYPERTENSION: Primary | ICD-10-CM

## 2023-09-26 PROCEDURE — 99284 EMERGENCY DEPT VISIT MOD MDM: CPT

## 2023-09-26 PROCEDURE — 99283 EMERGENCY DEPT VISIT LOW MDM: CPT

## 2023-09-26 RX ORDER — LISINOPRIL 5 MG/1
10 TABLET ORAL ONCE
Status: COMPLETED | OUTPATIENT
Start: 2023-09-26 | End: 2023-09-26

## 2023-09-26 RX ORDER — LISINOPRIL 10 MG/1
10 TABLET ORAL DAILY
Qty: 30 TABLET | Refills: 3 | Status: SHIPPED | OUTPATIENT
Start: 2023-09-26 | End: 2023-10-26

## 2023-09-26 NOTE — ED INITIAL ASSESSMENT (HPI)
Pt to ED sent by dentist. Per pt, she was supposed to have a tooth extraction and when getting registered her blood pressure was 190/101, her dentist then told her to come to ED. Pt denies cp or sob, denies cardiac history.

## 2023-09-27 NOTE — ED QUICK NOTES
Patient provided with discharge instructions. Verbalized understanding for plan of care at home and follow up. All question and concerns addressed prior to discharge. LEt pt know rx was sent to pharmacy.

## 2023-09-29 ENCOUNTER — IMMUNIZATION (OUTPATIENT)
Dept: LAB | Age: 53
End: 2023-09-29
Attending: EMERGENCY MEDICINE

## 2023-09-29 DIAGNOSIS — Z23 NEED FOR VACCINATION: Primary | ICD-10-CM

## 2023-09-29 PROCEDURE — 90686 IIV4 VACC NO PRSV 0.5 ML IM: CPT

## 2023-09-29 PROCEDURE — 90471 IMMUNIZATION ADMIN: CPT

## 2023-10-04 ENCOUNTER — IMMUNIZATION (OUTPATIENT)
Dept: LAB | Age: 53
End: 2023-10-04
Attending: EMERGENCY MEDICINE
Payer: COMMERCIAL

## 2023-10-04 DIAGNOSIS — Z23 NEED FOR VACCINATION: Primary | ICD-10-CM

## 2023-10-04 PROCEDURE — 90480 ADMN SARSCOV2 VAC 1/ONLY CMP: CPT

## 2023-10-05 ENCOUNTER — PATIENT MESSAGE (OUTPATIENT)
Dept: FAMILY MEDICINE CLINIC | Facility: CLINIC | Age: 53
End: 2023-10-05

## 2023-10-05 DIAGNOSIS — I10 PRIMARY HYPERTENSION: Primary | ICD-10-CM

## 2023-10-06 NOTE — TELEPHONE ENCOUNTER
Dr. Anjelica Armendariz, patient was seen in ED 9/26 for hypertension. Was prescribed lisinopril 10mg on discharge. Patient has stopped lisinopril due to dry cough side effect. Would you like patient to make an appointment?

## 2023-10-06 NOTE — TELEPHONE ENCOUNTER
From: Rakesh Dawson  To: Martinezmalinda Lopes  Sent: 10/5/2023 7:37 PM CDT  Subject: Lisinopril 10 mg    Good Dr. Rubin Ruiz   My bp was very higher last week, prompting me to go to urgent care. A persistent dry cough is now the result. Yesterday was the last day I took the medicine. Will you prescribe something different?

## 2023-10-06 NOTE — TELEPHONE ENCOUNTER
Yes, please have the patient schedule appointment as soon as possible to check blood pressure and adjust medication as indicated - FIONA Booth abdominal pain

## 2023-10-07 RX ORDER — AMLODIPINE BESYLATE 5 MG/1
5 TABLET ORAL DAILY
Qty: 30 TABLET | Refills: 0 | Status: SHIPPED | OUTPATIENT
Start: 2023-10-07

## 2023-10-07 NOTE — TELEPHONE ENCOUNTER
Nabil message sent with Dr Ross General recommendation. CSS=please assists .      Future Appointments   Date Time Provider Ajit [place]   1/22/2024  5:40 PM DO CARLA Mac   1/26/2024  3:40 PM Danielle Chew MD [facility]

## 2023-10-07 NOTE — TELEPHONE ENCOUNTER
Please see if the patient can be seen on Thursday, October 12, 2023 as a double book at 11:40 AM.  She will be seen technically around noon on the same day. I am sending in a different blood pressure medication and hopefully this will address her pressure without creating side effect. Please call her and let her know. Thank you.

## 2023-10-12 ENCOUNTER — TELEPHONE (OUTPATIENT)
Dept: FAMILY MEDICINE CLINIC | Facility: CLINIC | Age: 53
End: 2023-10-12

## 2023-10-12 ENCOUNTER — OFFICE VISIT (OUTPATIENT)
Dept: FAMILY MEDICINE CLINIC | Facility: CLINIC | Age: 53
End: 2023-10-12

## 2023-10-12 VITALS
BODY MASS INDEX: 43.61 KG/M2 | DIASTOLIC BLOOD PRESSURE: 84 MMHG | SYSTOLIC BLOOD PRESSURE: 132 MMHG | TEMPERATURE: 98 F | WEIGHT: 246.13 LBS | HEART RATE: 88 BPM | HEIGHT: 63 IN

## 2023-10-12 DIAGNOSIS — G47.33 OSA ON CPAP: ICD-10-CM

## 2023-10-12 DIAGNOSIS — E66.01 MORBID OBESITY WITH BMI OF 40.0-44.9, ADULT (HCC): ICD-10-CM

## 2023-10-12 DIAGNOSIS — I10 PRIMARY HYPERTENSION: Primary | ICD-10-CM

## 2023-10-12 NOTE — TELEPHONE ENCOUNTER
Patient is calling to speak to PCPs nurse and refuses to mention the reason. Patient is upset and mentions she is having a hard time communicating with the office and needs to talk to someone.

## 2023-10-12 NOTE — PATIENT INSTRUCTIONS
Medication reviewed and renewed where needed and appropriate. Recommend weight loss via daily exercising and consistent healthy dietary changes. Encouraged physical fitness and daily physical activity daily. Monitor blood pressures and record at home. Limit salt intake. Comply with medications.

## 2023-10-12 NOTE — TELEPHONE ENCOUNTER
Called patient, confirmed name and . Patient states she has no needs at this time. She was seen in the office today and no further issues.

## 2023-10-18 ENCOUNTER — TELEPHONE (OUTPATIENT)
Dept: PULMONOLOGY | Facility: CLINIC | Age: 53
End: 2023-10-18

## 2023-10-18 NOTE — TELEPHONE ENCOUNTER
Pt called speak to RN about recall on [employer] CPAP. Pt has not been seen since 2019 and would like to know if this office is still getting downloads and also if she needs to be seen in office. Please call.

## 2023-10-19 NOTE — TELEPHONE ENCOUNTER
Dr. Omar Ortiz requesting earlier appointment due to CPAP ortiz recall and new script needed for CPAP. Last seen 9/2019. Patient scheduled 11/6 in 15 minute slot. OK with follow-up or CONSULT needed?

## 2023-11-03 DIAGNOSIS — I10 PRIMARY HYPERTENSION: ICD-10-CM

## 2023-11-03 RX ORDER — AMLODIPINE BESYLATE 5 MG/1
5 TABLET ORAL DAILY
Qty: 90 TABLET | Refills: 1 | Status: SHIPPED | OUTPATIENT
Start: 2023-11-03

## 2023-11-03 NOTE — TELEPHONE ENCOUNTER
Refill passed per [employer] protocol.     Requested Prescriptions   Pending Prescriptions Disp Refills    AMLODIPINE 5 MG Oral Tab [Pharmacy Med Name: AMLODIPINE BESYLATE 5 MG TAB] 30 tablet 0     Sig: TAKE 1 TABLET BY MOUTH EVERY DAY       Hypertensive Medications Protocol Passed - 11/3/2023 12:13 AM        Passed - In person appointment in the past 12 or next 3 months     Recent Outpatient Visits              3 weeks ago Primary hypertension    [facility], [facility] [address] [place] [address] Filomena Kussmaul, [place]    Office Visit    5 months ago Postop check    Samara Strange, [address], [address] [facility] [address] [facility] [address] Marcia Badillo MD    Office Visit    6 months ago Symptoms involving urinary system    [facility] [place], [place]    Office Visit    9 months ago PMB (postmenopausal bleeding)    [facility], [address] [facility] Marcia Badillo MD    Office Visit    9 months ago Intramural leiomyoma of uterus    [facility], [facility] [address] [place] [address] Filomena Kussmaul,     Office Visit          Future Appointments         Provider Department Appt Notes    In 3 days MD Samara Kern, [facility] CPAP recall    In 2 months Roselind Lacrosse, [place], DO Dortha Fuller, [place] , [facility] Physical    In 3 months MD Samara Benton, [address], [address] - Last BP reading less than 140/90     BP Readings from Last 1 Encounters:  10/12/23 : 132/84              Passed - CMP or BMP in past 6 months     Recent Results (from the past 4392 hour(s))   Basic Metabolic Panel (8)    Collection Time: 08/16/23  1:13 PM   Result Value Ref Range    Glucose 92 70 - 99 mg/dL    Sodium 138 136 - 145 mmol/L    Potassium 4.6 3.5 - 5.1 mmol/L    Chloride 105 98 - 112 mmol/L    CO2 31.0 21.0 - 32.0 mmol/L    Anion Gap 2 0 - 18 mmol/L    BUN 13 7 - 18 mg/dL    Creatinine 1.19 (H) 0.55 - 1.02 mg/dL    BUN/CREA Ratio 10.9 10.0 - 20.0    Calcium, Total 9.4 8.5 - 10.1 mg/dL    Calculated Osmolality 286 275 - 295 mOsm/kg    eGFR-Cr 54 (L) >=60 mL/min/1.73m2     *Note: Due to a large number of results and/or encounters for the requested time period, some results have not been displayed. A complete set of results can be found in Results Review.                Passed - In person appointment or virtual visit in the past 6 months     Recent Outpatient Visits              3 weeks ago Primary hypertension    [facility], [address], [place], [place]    Office Visit    5 months ago Postop check    [address], [address] [facility], [address] [facility] Ulysses Caroli, [place]    Office Visit    6 months ago Symptoms involving urinary system    Selina Esquivel, [place], [place]    Office Visit    9 months ago PMB (postmenopausal bleeding)    [facility], [address] [facility] Ulysses Caroli, MD    Office Visit    9 months ago Intramural leiomyoma of uterus    [facility], [address], [place],     Office Visit          Future Appointments         Provider Department Appt Notes    In 3 days Angi Currie MD [address], [facility] CPAP recall    In 2 months Julio Maki DO [address], [place] [address], [facility] Physical    In 3 months Ulysses Caroli, MD [address], [address], [address] or GFRAA > 50     GFR Evaluation  EGFRCR: 54 , resulted on 8/16/2023             Future Appointments         Provider Department Appt Notes    In 3 days Angi Currie MD [address], [address] [place] [address] CPAP recall    In 2 months Beverly Mcdermott DO Julia MICHELLE [place] [facility]    In 3 months MD Deander Andreedgardo Barnettdestinee, [address], [address]           Recent Outpatient Visits              3 weeks ago Primary hypertension    [facility], [address]    Office Visit    5 months ago Postop check    Har[facility] Zapata, [address], [address] Cameron Nowak MD    Office Visit    6 months ago Symptoms involving urinary system    Julia Ross, [place], [place]    Office Visit    9 months ago PMB (postmenopausal bleeding)    [facility], [address] [facility] Cameron Nowak MD    Office Visit    9 months ago Intramural leiomyoma of uterus    [facility], [address]    Office Visit

## 2023-11-06 ENCOUNTER — OFFICE VISIT (OUTPATIENT)
Dept: PULMONOLOGY | Facility: CLINIC | Age: 53
End: 2023-11-06

## 2023-11-06 VITALS
BODY MASS INDEX: 43.94 KG/M2 | WEIGHT: 248 LBS | HEART RATE: 80 BPM | HEIGHT: 63 IN | OXYGEN SATURATION: 99 % | SYSTOLIC BLOOD PRESSURE: 106 MMHG | DIASTOLIC BLOOD PRESSURE: 75 MMHG

## 2023-11-06 DIAGNOSIS — G47.33 OSA (OBSTRUCTIVE SLEEP APNEA): Primary | ICD-10-CM

## 2023-11-06 NOTE — PROGRESS NOTES
The patient is a 58-year-old female who I know well from prior evaluation comes in now for follow-up. The patient has had hypertension associated headaches and she had not been compliant with her CPAP device until now as she has resumed usage. When she uses it, she uses it on average of 9 hours per night and residual events of 3.9/h. She is benefiting from ongoing usage. She also notes that her plantar fasciitis is improved when she uses it. Review of Systems:  Vision normal. Ear nose and throat normal. Bowel normal. Bladder function normal. No depression. No thyroid disease. No lymphatic system concerns. No rash. Muscles and joints unremarkable. No weight loss no weight gain. Physical Examination:  Vital signs normal. HEENT examination is unremarkable with pupils equal round and reactive to light and accommodation. Neck without adenopathy, thyromegaly, JVD nor bruit. Lungs clear to auscultation and percussion. Cardiac regular rate and rhythm no murmur. Abdomen nontender, without hepatosplenomegaly and no mass appreciable. Extremities and Musculoskeletal without clubbing cyanosis nor edema, and mobility acceptable. Neurologic grossly intact with symmetric tone and strength and reflex. Assessment and plan:  1. Obstructive sleep apnea-patient to be vigilant with usage of CPAP every night all night, weight loss, avoid alcohol, avoid sedating drug, never drive if sleepy, see me in the office at the 1 year interval or sooner if needed and contact me promptly if new trouble and I again we will download data from the respiratory device to assess for efficacy and compliance.

## 2023-11-07 ENCOUNTER — TELEPHONE (OUTPATIENT)
Dept: PULMONOLOGY | Facility: CLINIC | Age: 53
End: 2023-11-07

## 2023-11-07 ENCOUNTER — PATIENT MESSAGE (OUTPATIENT)
Dept: PULMONOLOGY | Facility: CLINIC | Age: 53
End: 2023-11-07

## 2023-11-07 NOTE — TELEPHONE ENCOUNTER
Please let the patient know that I think this is amazing and I will discuss it with an orthopedist colleague.

## 2023-12-21 ENCOUNTER — HOSPITAL ENCOUNTER (EMERGENCY)
Facility: HOSPITAL | Age: 53
Discharge: HOME | End: 2023-12-21
Attending: EMERGENCY MEDICINE
Payer: COMMERCIAL

## 2023-12-21 VITALS
OXYGEN SATURATION: 98 % | RESPIRATION RATE: 18 BRPM | SYSTOLIC BLOOD PRESSURE: 140 MMHG | TEMPERATURE: 98 F | HEART RATE: 82 BPM | DIASTOLIC BLOOD PRESSURE: 78 MMHG

## 2023-12-21 DIAGNOSIS — J02.9 VIRAL PHARYNGITIS: Primary | ICD-10-CM

## 2023-12-21 LAB
S PYO AG THROAT QL: NEGATIVE
SARS-COV-2 RNA RESP QL NAA+PROBE: NOT DETECTED

## 2023-12-21 PROCEDURE — 87880 STREP A ASSAY W/OPTIC: CPT

## 2023-12-21 PROCEDURE — 99283 EMERGENCY DEPT VISIT LOW MDM: CPT

## 2024-01-22 ENCOUNTER — OFFICE VISIT (OUTPATIENT)
Dept: FAMILY MEDICINE CLINIC | Facility: CLINIC | Age: 54
End: 2024-01-22

## 2024-01-22 ENCOUNTER — HOSPITAL ENCOUNTER (OUTPATIENT)
Dept: GENERAL RADIOLOGY | Age: 54
Discharge: HOME | End: 2024-01-22
Attending: FAMILY MEDICINE
Payer: COMMERCIAL

## 2024-01-22 VITALS
HEIGHT: 62.8 IN | TEMPERATURE: 98 F | BODY MASS INDEX: 42.7 KG/M2 | HEART RATE: 106 BPM | WEIGHT: 241 LBS | SYSTOLIC BLOOD PRESSURE: 119 MMHG | DIASTOLIC BLOOD PRESSURE: 79 MMHG | RESPIRATION RATE: 18 BRPM

## 2024-01-22 DIAGNOSIS — Z23 NEED FOR ZOSTER VACCINATION: ICD-10-CM

## 2024-01-22 DIAGNOSIS — I10 PRIMARY HYPERTENSION: ICD-10-CM

## 2024-01-22 DIAGNOSIS — E66.01 MORBID OBESITY WITH BMI OF 40.0-44.9, ADULT (HCC): ICD-10-CM

## 2024-01-22 DIAGNOSIS — M25.562 ACUTE PAIN OF LEFT KNEE: ICD-10-CM

## 2024-01-22 DIAGNOSIS — M25.562 ACUTE PAIN OF LEFT KNEE: Primary | ICD-10-CM

## 2024-01-22 PROCEDURE — 3008F BODY MASS INDEX DOCD: CPT | Performed by: FAMILY MEDICINE

## 2024-01-22 PROCEDURE — 3074F SYST BP LT 130 MM HG: CPT | Performed by: FAMILY MEDICINE

## 2024-01-22 PROCEDURE — 99214 OFFICE O/P EST MOD 30 MIN: CPT | Performed by: FAMILY MEDICINE

## 2024-01-22 PROCEDURE — 3078F DIAST BP <80 MM HG: CPT | Performed by: FAMILY MEDICINE

## 2024-01-22 PROCEDURE — 73562 X-RAY EXAM OF KNEE 3: CPT | Performed by: FAMILY MEDICINE

## 2024-01-23 NOTE — PATIENT INSTRUCTIONS
Continue with ida and tumeric. Consider cherry tart juice.   Xray of left knee.  MRI of left knee recommended ordered.  Return for labs.
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
## 2024-02-09 ENCOUNTER — LAB ENCOUNTER (OUTPATIENT)
Dept: LAB | Facility: HOSPITAL | Age: 54
End: 2024-02-09
Attending: OBSTETRICS & GYNECOLOGY
Payer: COMMERCIAL

## 2024-02-09 ENCOUNTER — OFFICE VISIT (OUTPATIENT)
Dept: OBGYN CLINIC | Facility: CLINIC | Age: 54
End: 2024-02-09

## 2024-02-09 VITALS
BODY MASS INDEX: 42.34 KG/M2 | SYSTOLIC BLOOD PRESSURE: 140 MMHG | DIASTOLIC BLOOD PRESSURE: 87 MMHG | HEIGHT: 62.7 IN | HEART RATE: 73 BPM | WEIGHT: 236 LBS

## 2024-02-09 DIAGNOSIS — E66.01 MORBID OBESITY WITH BMI OF 40.0-44.9, ADULT (HCC): ICD-10-CM

## 2024-02-09 DIAGNOSIS — Z11.3 SCREEN FOR STD (SEXUALLY TRANSMITTED DISEASE): ICD-10-CM

## 2024-02-09 DIAGNOSIS — I10 PRIMARY HYPERTENSION: ICD-10-CM

## 2024-02-09 DIAGNOSIS — Z01.419 ENCOUNTER FOR GYNECOLOGICAL EXAMINATION WITHOUT ABNORMAL FINDING: Primary | ICD-10-CM

## 2024-02-09 DIAGNOSIS — Z12.31 VISIT FOR SCREENING MAMMOGRAM: ICD-10-CM

## 2024-02-09 DIAGNOSIS — Z90.710 S/P HYSTERECTOMY: ICD-10-CM

## 2024-02-09 LAB
ALBUMIN SERPL-MCNC: 4.2 G/DL (ref 3.2–4.8)
ALBUMIN/GLOB SERPL: 1.1 {RATIO} (ref 1–2)
ALP LIVER SERPL-CCNC: 81 U/L
ALT SERPL-CCNC: 16 U/L
ANION GAP SERPL CALC-SCNC: 4 MMOL/L (ref 0–18)
AST SERPL-CCNC: 18 U/L (ref ?–34)
BASOPHILS # BLD AUTO: 0.01 X10(3) UL (ref 0–0.2)
BASOPHILS NFR BLD AUTO: 0.2 %
BILIRUB SERPL-MCNC: 0.6 MG/DL (ref 0.3–1.2)
BUN BLD-MCNC: 12 MG/DL (ref 9–23)
BUN/CREAT SERPL: 16.4 (ref 10–20)
CALCIUM BLD-MCNC: 9.7 MG/DL (ref 8.7–10.4)
CHLORIDE SERPL-SCNC: 109 MMOL/L (ref 98–112)
CHOLEST SERPL-MCNC: 177 MG/DL (ref ?–200)
CO2 SERPL-SCNC: 30 MMOL/L (ref 21–32)
CREAT BLD-MCNC: 0.73 MG/DL
DEPRECATED RDW RBC AUTO: 53.8 FL (ref 35.1–46.3)
EGFRCR SERPLBLD CKD-EPI 2021: 98 ML/MIN/1.73M2 (ref 60–?)
EOSINOPHIL # BLD AUTO: 0.02 X10(3) UL (ref 0–0.7)
EOSINOPHIL NFR BLD AUTO: 0.4 %
ERYTHROCYTE [DISTWIDTH] IN BLOOD BY AUTOMATED COUNT: 16.3 % (ref 11–15)
FASTING PATIENT LIPID ANSWER: NO
FASTING STATUS PATIENT QL REPORTED: NO
GLOBULIN PLAS-MCNC: 3.8 G/DL (ref 2.8–4.4)
GLUCOSE BLD-MCNC: 96 MG/DL (ref 70–99)
HCT VFR BLD AUTO: 42.4 %
HDLC SERPL-MCNC: 35 MG/DL (ref 40–59)
HGB BLD-MCNC: 13.4 G/DL
IMM GRANULOCYTES # BLD AUTO: 0.02 X10(3) UL (ref 0–1)
IMM GRANULOCYTES NFR BLD: 0.4 %
LDLC SERPL CALC-MCNC: 117 MG/DL (ref ?–100)
LYMPHOCYTES # BLD AUTO: 2.12 X10(3) UL (ref 1–4)
LYMPHOCYTES NFR BLD AUTO: 42.4 %
MCH RBC QN AUTO: 28.6 PG (ref 26–34)
MCHC RBC AUTO-ENTMCNC: 31.6 G/DL (ref 31–37)
MCV RBC AUTO: 90.4 FL
MONOCYTES # BLD AUTO: 0.39 X10(3) UL (ref 0.1–1)
MONOCYTES NFR BLD AUTO: 7.8 %
NEUTROPHILS # BLD AUTO: 2.44 X10 (3) UL (ref 1.5–7.7)
NEUTROPHILS # BLD AUTO: 2.44 X10(3) UL (ref 1.5–7.7)
NEUTROPHILS NFR BLD AUTO: 48.8 %
NONHDLC SERPL-MCNC: 142 MG/DL (ref ?–130)
OSMOLALITY SERPL CALC.SUM OF ELEC: 296 MOSM/KG (ref 275–295)
PLATELET # BLD AUTO: 406 10(3)UL (ref 150–450)
POTASSIUM SERPL-SCNC: 4.4 MMOL/L (ref 3.5–5.1)
PROT SERPL-MCNC: 8 G/DL (ref 5.7–8.2)
RBC # BLD AUTO: 4.69 X10(6)UL
SODIUM SERPL-SCNC: 143 MMOL/L (ref 136–145)
T PALLIDUM AB SER QL IA: NONREACTIVE
TRIGL SERPL-MCNC: 137 MG/DL (ref 30–149)
TSI SER-ACNC: 0.87 MIU/ML (ref 0.55–4.78)
VLDLC SERPL CALC-MCNC: 24 MG/DL (ref 0–30)
WBC # BLD AUTO: 5 X10(3) UL (ref 4–11)

## 2024-02-09 PROCEDURE — 85025 COMPLETE CBC W/AUTO DIFF WBC: CPT

## 2024-02-09 PROCEDURE — 36415 COLL VENOUS BLD VENIPUNCTURE: CPT

## 2024-02-09 PROCEDURE — 80061 LIPID PANEL: CPT

## 2024-02-09 PROCEDURE — 87389 HIV-1 AG W/HIV-1&-2 AB AG IA: CPT | Performed by: OBSTETRICS & GYNECOLOGY

## 2024-02-09 PROCEDURE — 86780 TREPONEMA PALLIDUM: CPT | Performed by: OBSTETRICS & GYNECOLOGY

## 2024-02-09 PROCEDURE — 80053 COMPREHEN METABOLIC PANEL: CPT

## 2024-02-09 PROCEDURE — 84443 ASSAY THYROID STIM HORMONE: CPT

## 2024-02-09 PROCEDURE — 99396 PREV VISIT EST AGE 40-64: CPT | Performed by: OBSTETRICS & GYNECOLOGY

## 2024-02-09 PROCEDURE — 87591 N.GONORRHOEAE DNA AMP PROB: CPT

## 2024-02-09 PROCEDURE — 87491 CHLMYD TRACH DNA AMP PROBE: CPT

## 2024-02-09 NOTE — PROGRESS NOTES
Samaria Frey is a 53 year old female  Patient's last menstrual period was 2023. who presents for   Chief Complaint   Patient presents with    Gyn Exam     ANNUAL EXAM     She has no gyne complaints.  She has lost weight and is very happy about this- she is eating a raw diet.    OBSTETRICS HISTORY:  OB History    Para Term  AB Living   2 0 0 0 2 0   SAB IAB Ectopic Multiple Live Births   2 0 0 0 0       GYNE HISTORY:        MEDICAL HISTORY:  Past Medical History:   Diagnosis Date    Hepatic lesion     multiple, FNH/hemangioma/adenoma? repeat MRI in Dec 2020    History of blood transfusion 2022    Screen for colon cancer 2019    repeat CLN in 10 yrs    Sleep apnea        SURGICAL HISTORY:  Past Surgical History:   Procedure Laterality Date    TOTAL ABDOM HYSTERECTOMY  2023       SOCIAL HISTORY:  Social History     Socioeconomic History    Marital status: Single   Tobacco Use    Smoking status: Former     Packs/day: 0.50     Years: 23.00     Additional pack years: 0.00     Total pack years: 11.50     Types: Cigarettes     Quit date: 2017     Years since quittin.6    Smokeless tobacco: Never   Vaping Use    Vaping Use: Never used   Substance and Sexual Activity    Alcohol use: Not Currently    Drug use: Never    Sexual activity: Not Currently     Partners: Male         FAMILY HISTORY:  Family History   Problem Relation Age of Onset    Bipolar Disorder Mother     Diabetes Mother     Breast Cancer Maternal Grandmother         age unknown    Breast Cancer Maternal Aunt         age unknown    Breast Cancer Maternal Cousin Female         age unknown    Breast Cancer Paternal Cousin Female         age unknown    Cancer Maternal Aunt         pancreatic/age unknown    Other (pulmonary HTN) Father        MEDICATIONS:  No current outpatient medications on file.    ALLERGIES:  No Known Allergies      Review of Systems:  Constitutional:  Denies fatigue, night sweats, hot  flashes  Eyes:  denies blurred or double vision  Cardiovascular:  denies chest pain or palpitations  Respiratory:  denies shortness of breath  Gastrointestinal:  denies heartburn, abdominal pain, diarrhea or constipation  Genitourinary:  denies dysuria, incontinence, abnormal vaginal discharge, vaginal itching  Musculoskeletal:  denies back pain.  Skin/Breast:  Denies any breast pain, lumps, or discharge.   Neurological:  denies headaches, extremity weakness or numbness.  Psychiatric: denies depression or anxiety.  Endocrine:   denies excessive thirst or urination.  Heme/Lymph:  denies history of anemia, easy bruising or bleeding.    Depression Screening (PHQ-2/PHQ-9): Over the LAST 2 WEEKS   Little interest or pleasure in doing things (over the last two weeks)?: Not at all    Feeling down, depressed, or hopeless (over the last two weeks)?: Not at all    PHQ-2 SCORE: 0         Blood pressure 140/87, pulse 73, height 5' 2.7\" (1.593 m), weight 236 lb (107 kg), last menstrual period 03/27/2023.    PHYSICAL EXAM:   Constitutional: well developed, well nourished  Head/Face: normocephalic  Neck/Thyroid: thyroid symmetric, no thyromegaly, no nodules, no adenopathy  Lymphatic:no abnormal supraclavicular or axillary adenopathy is noted  Breast: normal without palpable masses, tenderness, asymmetry, nipple discharge, nipple retraction or skin changes  Respiratory:  lungs clear to auscultation bilaterally  Cardiovascular: regular rate and rhythm, no significant murmur  Abdomen:  soft, nontender, nondistended, no masses, incision well healed  Skin/Hair: no unusual rashes or bruises  Extremities: no edema, no cyanosis  Psychiatric:  Oriented to time, place, person and situation. Appropriate mood and affect    Pelvic Exam:  External Genitalia: normal appearance, hair distribution, and no lesions  Urethral Meatus:  normal in size, location, without lesions and prolapse  Bladder:  No fullness, masses or tenderness  Vagina:  Normal  appearance without lesions, no abnormal discharge, cuff intact  Adnexa: normal without masses or tenderness  Perineum: normal  Rectovaginal: no masses, normal tone  Anus: no thrombosed or ulcerated hemorroids    Assessment & Plan:   ASCCP guidelines discussed,cotest done,mammogram ordered,rtc 1 year for annual exam   SBE encouraged  Encounter Diagnoses   Name Primary?    Encounter for gynecological examination without abnormal finding Yes    Screen for STD (sexually transmitted disease)     S/P hysterectomy     Visit for screening mammogram      Orders Placed This Encounter   Procedures    T Pallidum Screening [place]    HIV Ag/Ab Combo    HIV Ag/Ab Combo    HIV Ag/Ab Combo Lavender Hold    Chlamydia/Gc Amplification     Requested Prescriptions      No prescriptions requested or ordered in this encounter     None

## 2024-02-12 LAB
C TRACH DNA SPEC QL NAA+PROBE: NEGATIVE
N GONORRHOEA DNA SPEC QL NAA+PROBE: NEGATIVE

## 2024-02-13 ENCOUNTER — TELEPHONE (OUTPATIENT)
Dept: ORTHOPEDICS CLINIC | Facility: CLINIC | Age: 54
End: 2024-02-13

## 2024-02-13 NOTE — TELEPHONE ENCOUNTER
Pt came to office for appt 2/13, MD out ,Pt states she did not get VM , appt scheduled for 3/5/24 requesting a sooner appt.  states she having a lot of pain, pls advise

## 2024-02-13 NOTE — TELEPHONE ENCOUNTER
Called pt and she states left knee pain started several months ago. She denies any recent injury. Pt had left knee XR on 1/22/24, no fx, shows OA.   Advised pt 3/5/24 is the soonest appt we have. She is on a wait list and will get a notification if anything sooner opens up. Did also offer her the phone number to [facility] and she would like the info sent through [facility].

## 2024-02-16 ENCOUNTER — HOSPITAL ENCOUNTER (OUTPATIENT)
Dept: MRI IMAGING | Age: 54
Discharge: HOME | End: 2024-02-16
Attending: FAMILY MEDICINE
Payer: COMMERCIAL

## 2024-02-16 DIAGNOSIS — M25.562 ACUTE PAIN OF LEFT KNEE: ICD-10-CM

## 2024-02-16 PROCEDURE — 73721 MRI JNT OF LWR EXTRE W/O DYE: CPT | Performed by: FAMILY MEDICINE

## 2024-02-26 NOTE — TELEPHONE ENCOUNTER
----- Message -----     From: Courtney Hunter Sent: 3/18/2020  8:15 AM CDT       To: Chapin Salas, DO  Subject: Other    Good morning Dr. Freed Show     As you are already aware, I reside in the [facility] & work at a dental office 27-Feb-2024 00:34

## 2024-03-05 ENCOUNTER — OFFICE VISIT (OUTPATIENT)
Dept: ORTHOPEDICS CLINIC | Facility: CLINIC | Age: 54
End: 2024-03-05

## 2024-03-05 VITALS — BODY MASS INDEX: 41.82 KG/M2 | HEIGHT: 63 IN | WEIGHT: 236 LBS

## 2024-03-05 DIAGNOSIS — E66.01 CLASS 3 SEVERE OBESITY DUE TO EXCESS CALORIES WITH SERIOUS COMORBIDITY AND BODY MASS INDEX (BMI) OF 40.0 TO 44.9 IN ADULT (HCC): ICD-10-CM

## 2024-03-05 DIAGNOSIS — M17.12 PRIMARY OSTEOARTHRITIS OF LEFT KNEE: Primary | ICD-10-CM

## 2024-03-05 PROCEDURE — 99204 OFFICE O/P NEW MOD 45 MIN: CPT | Performed by: ORTHOPAEDIC SURGERY

## 2024-03-06 NOTE — H&P
NURSING INTAKE COMMENTS:   Chief Complaint   Patient presents with    Knee Pain     Consult - L knee - Pain couple months ago. Pain 3/10 - no injury -  MRI and xr of knee in system.       HPI: This 53 year old female presents today with persistent left knee pain for several months without injury.  X-rays show mild joint space narrowing medially and in the patellofemoral joint.  She had MRI however which showed near complete loss of cartilage of her patella and significant cartilage loss on the medial femoral condyle.  There is a small posterior medial meniscus tear as well.  The lateral side was fairly normal.  She had a mild effusion but no Baker's cyst.  I showed her the actual images.    She lives by herself in a house with stairs.  She is an  for a dental group.  She has to drive quite a bit to get to and from work.  At home she is sedentary.  She denies diabetes.  She prefers more natural methods for her health care.    Past Medical History:   Diagnosis Date    Hepatic lesion 2019    multiple, FNH/hemangioma/adenoma? repeat MRI in Dec 2020    History of blood transfusion 12/2022    Screen for colon cancer 2019    repeat CLN in 10 yrs    Sleep apnea      Past Surgical History:   Procedure Laterality Date    TOTAL ABDOM HYSTERECTOMY  04/03/2023     No current outpatient medications on file.     No Known Allergies  Family History   Problem Relation Age of Onset    Bipolar Disorder Mother     Diabetes Mother     Breast Cancer Maternal Grandmother         age unknown    Breast Cancer Maternal Aunt         age unknown    Breast Cancer Maternal Cousin Female         age unknown    Breast Cancer Paternal Cousin Female         age unknown    Cancer Maternal Aunt         pancreatic/age unknown    Other (pulmonary HTN) Father      No family Hx of DVT/PE    Social History     Occupational History    Not on file   Tobacco Use    Smoking status: Former     Packs/day: 0.50     Years: 23.00     Additional pack  years: 0.00     Total pack years: 11.50     Types: Cigarettes     Quit date: 2017     Years since quittin.7    Smokeless tobacco: Never   Vaping Use    Vaping Use: Never used   Substance and Sexual Activity    Alcohol use: Not Currently    Drug use: Never    Sexual activity: Not Currently     Partners: Male        Review of Systems:  GENERAL: feels generally well, no significant weight loss or weight gain  SKIN: no ulcerated or worrisome skin lesions  EYES:denies blurred vision or double vision  HEENT: denies new nasal congestion, sinus pain or ST  LUNGS: denies shortness of breath  CARDIOVASCULAR: denies chest pain  GI: no hematemesis, no worsening heartburn, no diarrhea  : no dysuria, no blood in urine, no difficulty urinating, no incontinence  MUSCULOSKELETAL: no other musculoskeletal complaints other than in HPI  NEURO: no numbness or tingling, no weakness or balance disorder  PSYCHE: no depression or anxiety  HEMATOLOGIC: no hx of blood dyscrasia, no Hx DVT/PE  ENDOCRINE: no thyroid or diabetes issues  ALL/ASTHMA: no new hx of severe allergy or asthma    Physical Examination:    Ht 5' 3\" (1.6 m)   Wt 236 lb (107 kg)   LMP 2023   BMI 41.81 kg/m²   Constitutional: appears well hydrated, alert and responsive, no acute distress noted  Extremities: Exam shows the skin on the both legs to be healthy without pitting edema or calf tenderness.  No knee effusion that was obvious and no asymmetric warmth.  Musculoskeletal: She good motion from 0 to 125 degrees for the left knee.  Initially she could not localize her pain but patella grind seem to be consistent with most of her pain.  Her worst pain is when she gets up from a chair or goes up a step.  She did have posterior medial joint line pain.  The pes was nontender and valgus stressing did not bother her.  No instability to the cruciate or collateral ligaments.  I do not appreciate a Baker's cyst.  Neurological: Normal motor and sensory left lower  extremity.      Imaging: X-rays and MRI as in HPI.      MRI KNEE, LEFT (MDR=16521)    Result Date: 2/16/2024  PROCEDURE:  MRI KNEE, LEFT (CPT=73721)  COMPARISON:  None.  INDICATIONS:  M25.562 Acute pain of left knee  TECHNIQUE:  Axial, coronal, and sagittal proton density with and without fat saturation images were obtained.  PATIENT STATED HISTORY: (As transcribed by Technologist)  Patient complians of left anterior knee pain.    FINDINGS:  LIGAMENTS:          The ACL, PCL, patellar retinacula, and collateral ligament complexes are intact.  There is thickening of the proximal, superficial fibers of the MCL without abnormal signal consistent with low-grade chronic sprain. MENISCI:            Medial meniscus reveals a subtle focus of intermediate grade signal along the posterior horn extending to the superior articular surface best seen on sagittal PD image 18 of series 4, suspicious for a subtle longitudinal oblique tear.   The lateral meniscus is intact and unremarkable. TENDONS:            The tendinous insertions about the knee are intact without significant tendinosis or tears. MUSCULATURE:        No evidence of strain, edema, or atrophy. BONY COMPARTMENTS:  No evidence of acute osseous abnormalities.  There is tricompartmental osteoarthritis, with numerous foci of moderate to high grade articular cartilage loss within all 3 compartments of the knee, most pronounced along the mid weight-bearing surface of the medial femoral condyle, medial tibial plateau as well as the median ridge of the patella.  The degree of arthropathy is least severe within the lateral femoral tibial compartment. SYNOVIUM:           There is a moderate knee joint effusion.  No intra-articular bodies.             CONCLUSION:  1. Tricompartmental osteoarthritis, most pronounced within the medial femoral tibial and patellofemoral compartments. 2.  Moderate joint effusion.  No intra-articular bodies. 3. There is a subtle focus of intermediate  signal along the posterior horn of the medial meniscus extending to the superior articular surface, suspicious for a subtle longitudinal oblique tear. 4. Grade 1 chronic sprain of the proximal, superficial fibers of the MCL.   LOCATION:  [facility]          Dictated by (CST): Andria Hwang DO on 2/16/2024 at 11:26 AM     Finalized by (CST): Andria Hwang DO on 2/16/2024 at 11:31 AM          Lab Results   Component Value Date    WBC 5.0 02/09/2024    HGB 13.4 02/09/2024    .0 02/09/2024      Lab Results   Component Value Date    GLU 96 02/09/2024    BUN 12 02/09/2024    CREATSERUM 0.73 02/09/2024    GFRNAA 81 01/15/2021    GFRAA 93 01/15/2021        Assessment and Plan:  Diagnoses and all orders for this visit:    Primary osteoarthritis of left knee  -     PHYSICAL THERAPY - INTERNAL  -     Cancel: DME - External  -     DME - External    Class 3 severe obesity due to excess calories with serious comorbidity and body mass index (BMI) of 40.0 to 44.9 in adult ([facility])  -     BARIATRICS - INTERNAL  -     DIETITIAN EDUCATION INITIAL, DIET (INTERNAL)        Assessment: Left knee moderate osteoarthritis as seen on MRI as the primary diagnosis for her.  She noticed when she lost 18 pounds, her patella pain was less.  I explained the fulcrum function of the kneecap and why the weight loss was significant to her patella.    Plan: To that effect, we talked about significant weight loss.  She was receptive to a bariatric as well as dietitian consult and referrals were given.  I offered cortisone injection but she declined currently.  We also talked about hyaluronic acid injection.  She declined for now as well.  She did except a course of physical therapy.  I prescribed a knee sleeve for warmth.  She asked about a heating pad and I told her that would be reasonable as long as she was awake.  I advised against falling asleep with a heating pad to prevent blistering.    We talked about over-the-counter creams such as capsaicin,  lidocaine, camphor, as well as the old standbies such as IcyHot and Bengay.    She changes her mind about a cortisone injection, she is welcome to make another appointment.  For now I will see her as needed.    Follow Up: No follow-ups on file.    Liban Bazzi MD

## 2024-03-25 ENCOUNTER — PATIENT MESSAGE (OUTPATIENT)
Dept: FAMILY MEDICINE CLINIC | Facility: CLINIC | Age: 54
End: 2024-03-25

## 2024-03-25 NOTE — TELEPHONE ENCOUNTER
From: Samaria Frey  To: Leighton Reyes  Sent: 3/25/2024 12:57 PM CDT  Subject: Refill BP med    Hi Dr. Reyes  Can you send a refill of my bp medication to the [facility] on file?

## 2024-03-26 NOTE — TELEPHONE ENCOUNTER
Medication originally prescribed on 10/7,  refilled on 11/3 (#90 with 1 refill). Patient did not  the refill in January and script . See TE 3/25 - Patient Message. Medication pended to run through protocol.

## 2024-03-28 RX ORDER — AMLODIPINE BESYLATE 5 MG/1
5 TABLET ORAL DAILY
Qty: 90 TABLET | Refills: 3 | Status: SHIPPED | OUTPATIENT
Start: 2024-03-28

## 2024-03-28 NOTE — TELEPHONE ENCOUNTER
Please review; protocol failed/No Protocol    LOV: 01/22/2024    Requested Prescriptions   Pending Prescriptions Disp Refills    amLODIPine 5 MG Oral Tab 90 tablet 3     Sig: Take 1 tablet (5 mg total) by mouth daily.       Hypertension Medications Protocol Failed - 3/26/2024 12:29 PM        Failed - Last BP reading less than 140/90     BP Readings from Last 1 Encounters:   02/09/24 140/87               Passed - CMP or BMP in past 12 months        Passed - In person appointment or virtual visit in the past 12 mos or appointment in next 3 mos     Recent Outpatient Visits              3 weeks ago Primary osteoarthritis of left knee    [facility] Liban Bazzi MD    Office Visit    1 month ago Encounter for gynecological examination without abnormal finding    [facility] - OB/GYN Ynes Chew MD    Office Visit    2 months ago Acute pain of left knee    [facility] Leighton Reyes DO    Office Visit    4 months ago TIEN (obstructive sleep apnea)    [facility] Donn Wick MD    Office Visit    5 months ago Primary hypertension    [facility] Leighton Reyes,     Office Visit          Future Appointments         Provider Department Appt Notes    In 7 months Donn Wick MD [facility] yearly    In 10 months Leighton Reyes DO [facility] annual physical    In 10 months Ynse Chew MD [facility] - OB/GYN annual               Passed - EGFRCR or GFRAA > 50     GFR Evaluation  EGFRCR: 98 , resulted on 2/9/2024             Future Appointments         Provider Department Appt Notes    In 7 months [facility]    In 10 months Leighton Reyes DO [facility] annual physical    In 10 months Ynes Chew MD [facility] - OB/GYN annual          Recent Outpatient Visits              3 weeks ago Primary osteoarthritis of left knee    [facility] Liban Bazzi MD    Office Visit    1 month ago Encounter for gynecological examination without abnormal finding    [facility] - OB/GYN Ynes Chew MD    Office Visit    2 months ago Acute pain of left knee    [facility] Leighton Reyes DO    Office Visit    4 months ago TIEN (obstructive sleep apnea)    [facility]    [facility]    5 months ago Primary hypertension    [facility] Leighton Reyes DO    Office Visit

## 2024-05-21 ENCOUNTER — HOSPITAL ENCOUNTER (OUTPATIENT)
Age: 54
Discharge: HOME | End: 2024-05-21

## 2024-05-21 VITALS
SYSTOLIC BLOOD PRESSURE: 134 MMHG | TEMPERATURE: 98 F | OXYGEN SATURATION: 100 % | RESPIRATION RATE: 20 BRPM | DIASTOLIC BLOOD PRESSURE: 65 MMHG | HEART RATE: 80 BPM

## 2024-05-21 DIAGNOSIS — U07.1 COVID-19 VIRUS INFECTION: Primary | ICD-10-CM

## 2024-05-21 DIAGNOSIS — R05.9 COUGH: ICD-10-CM

## 2024-05-21 DIAGNOSIS — R68.89 FLU-LIKE SYMPTOMS: ICD-10-CM

## 2024-05-21 LAB
POCT INFLUENZA A: NEGATIVE
POCT INFLUENZA B: NEGATIVE
SARS-COV-2 RNA RESP QL NAA+PROBE: DETECTED

## 2024-05-21 PROCEDURE — 99213 OFFICE O/P EST LOW 20 MIN: CPT | Performed by: NURSE PRACTITIONER

## 2024-05-21 PROCEDURE — U0002 COVID-19 LAB TEST NON-CDC: HCPCS | Performed by: NURSE PRACTITIONER

## 2024-05-21 PROCEDURE — 87502 INFLUENZA DNA AMP PROBE: CPT | Performed by: NURSE PRACTITIONER

## 2024-05-21 RX ORDER — BENZONATATE 200 MG/1
200 CAPSULE ORAL 3 TIMES DAILY PRN
Qty: 15 CAPSULE | Refills: 0 | Status: SHIPPED | OUTPATIENT
Start: 2024-05-21

## 2024-05-21 NOTE — ED PROVIDER NOTES
Patient Seen in: [facility]      History     Chief Complaint   Patient presents with    Cough     Stated Complaint: Cough, uri    Subjective:   HPI  Patient is a 53-year-old female who presents to immediate care center with concern for fever and cough that started 2 or 3 days ago.  She had just returned home from traveling in [place].  Ever, she denies known specific exposure.  Has had no shortness of air; no headache or dizziness; no chest pain.          Objective:   Past Medical History:    Hepatic lesion    multiple, FNH/hemangioma/adenoma? repeat MRI in Dec 2020    History of blood transfusion    Screen for colon cancer    repeat CLN in 10 yrs    Sleep apnea              Past Surgical History:   Procedure Laterality Date    Total abdom hysterectomy  2023                Social History     Socioeconomic History    Marital status: Single   Tobacco Use    Smoking status: Former     Current packs/day: 0.00     Average packs/day: 0.5 packs/day for 23.0 years (11.5 ttl pk-yrs)     Types: Cigarettes     Start date: 1994     Quit date: 2017     Years since quittin.9    Smokeless tobacco: Never   Vaping Use    Vaping status: Never Used   Substance and Sexual Activity    Alcohol use: Not Currently    Drug use: Never    Sexual activity: Not Currently     Partners: Male     Social Determinants of Health      [facility]    Social Connections    [facility]    Housing Stability              Review of Systems   Constitutional:  Positive for chills and fever.   HENT:  Positive for congestion.    Respiratory:  Positive for cough. Negative for shortness of breath.    Cardiovascular:  Negative for chest pain.   Gastrointestinal:  Negative for diarrhea and vomiting.   Skin:  Negative for rash.   Neurological:  Negative for weakness.       Positive for stated complaint:  Cough, uri  Other systems are as noted in HPI.  Constitutional and vital signs reviewed.      All other systems reviewed and negative except as noted above.    Physical Exam     ED Triage Vitals [05/21/24 1249]   /65   Pulse 80   Resp 20   Temp 98 °F (36.7 °C)   Temp src Oral   SpO2 100 %   O2 Device None (Room air)       Current Vitals:   Vital Signs  BP: 134/65  Pulse: 80  Resp: 20  Temp: 98 °F (36.7 °C)  Temp src: Oral    Oxygen Therapy  SpO2: 100 %  O2 Device: None (Room air)            Physical Exam  Vitals and nursing note reviewed.   Constitutional:       General: She is not in acute distress.     Appearance: She is not ill-appearing.   HENT:      Nose: Nose normal.   Eyes:      Conjunctiva/sclera: Conjunctivae normal.   Pulmonary:      Effort: Pulmonary effort is normal. No respiratory distress.      Breath sounds: Normal breath sounds.   Musculoskeletal:      Cervical back: Normal range of motion and neck supple.   Skin:     General: Skin is warm and dry.      Findings: No rash.   Neurological:      Mental Status: She is alert and oriented to person, place, and time.               ED Course     Labs Reviewed   RAPID SARS-COV-2 BY PCR - Abnormal; Notable for the following components:       Result Value    Rapid SARS-CoV-2 by PCR Detected (*)     All other components within normal limits   POCT FLU TEST - Normal    Narrative:     This assay is a rapid molecular in vitro test utilizing nucleic acid amplification of influenza A and B viral RNA.                      MDM      Patient's COVID risk ratification was reviewed at bedside: They have been vaccinated against COVID, They are oxygenating well and are not tachypneic. Pt has history of TIEN. She was advised she DOES have risk for complications from covid.     She was provided with information about paxlovid including risks (rebound and dysgeusia) and benefits to minimize her risk for complications. She declined.     Pt was encouraged to orally hydrate,  take Tylenol and ibuprofen at home as directed and follow current [facility] quarantine guidelines.  Pt. was advised, however, should they develop chest pain or shortness of air, have persistent vomiting, have dizziness they should be urgently reevaluated.  Pt states understanding and will do so.                                      Medical Decision Making  Differential diagnoses considered included, but are not exclusive of: bacterial vs viral sinusitis, dehydration, pneumonia, influenza, Covid-19 infection, and other viral upper respiratory infection.       Problems Addressed:  COVID-19 virus infection: self-limited or minor problem    Amount and/or Complexity of Data Reviewed  Labs:  Decision-making details documented in ED Course.    Risk  OTC drugs.  Prescription drug management.        Disposition and Plan     Clinical Impression:  1. COVID-19 virus infection    2. Flu-like symptoms    3. Cough         Disposition:  Discharge  5/21/2024  1:23 pm    Follow-up:  Leighton Reyes, DO  [address] [place] [id number]  [phone]    Schedule an appointment as soon as possible for a visit in 1 week  As needed          Medications Prescribed:  Discharge Medication List as of 5/21/2024  1:23 PM        START taking these medications    Details   benzonatate 200 MG Oral Cap Take 1 capsule (200 mg total) by mouth 3 (three) times daily as needed for cough., Normal, Disp-15 capsule, R-0

## 2024-05-21 NOTE — ED INITIAL ASSESSMENT (HPI)
Pt returned from international travel on Friday and has had a cough, runny nose, and body aches since

## 2024-07-01 ENCOUNTER — PATIENT MESSAGE (OUTPATIENT)
Dept: FAMILY MEDICINE CLINIC | Facility: CLINIC | Age: 54
End: 2024-07-01

## 2024-07-01 NOTE — TELEPHONE ENCOUNTER
From: Samaria Frey  To: Leighton Reyes  Sent: 7/1/2024 9:31 AM CDT  Subject: BP    Good morning Dr. Reyes     I was very impressed with your staff’s phone follow up mins ago regarding my BP. I had my staff take it about 15 mins ago & it was 140/91 (not bad)     Be blessing and a continued blessing to the body of [place]!     Samaria Desir

## 2024-07-01 NOTE — TELEPHONE ENCOUNTER
Dr. Reyes, please see [employer] message and advise. Thanks.    Future Appointments   Date Time Provider Department Center   11/11/2024  3:45 PM Donn Wick MD [facility]   1/27/2025  5:40 PM Leighton Reyes DO [facility]   2/10/2025  4:20 PM Ynes Chew MD [facility]     Reading added to patient reported vitals.

## 2024-07-18 ENCOUNTER — HOSPITAL ENCOUNTER (OUTPATIENT)
Age: 54
Discharge: HOME | End: 2024-07-18
Payer: COMMERCIAL

## 2024-07-18 ENCOUNTER — APPOINTMENT (OUTPATIENT)
Dept: GENERAL RADIOLOGY | Age: 54
End: 2024-07-18
Attending: NURSE PRACTITIONER
Payer: COMMERCIAL

## 2024-07-18 VITALS
OXYGEN SATURATION: 100 % | HEART RATE: 71 BPM | SYSTOLIC BLOOD PRESSURE: 151 MMHG | TEMPERATURE: 99 F | DIASTOLIC BLOOD PRESSURE: 64 MMHG | RESPIRATION RATE: 18 BRPM

## 2024-07-18 DIAGNOSIS — M25.561 ARTHRALGIA OF RIGHT LOWER LEG: Primary | ICD-10-CM

## 2024-07-18 PROCEDURE — 99213 OFFICE O/P EST LOW 20 MIN: CPT | Performed by: NURSE PRACTITIONER

## 2024-07-18 PROCEDURE — 73590 X-RAY EXAM OF LOWER LEG: CPT | Performed by: NURSE PRACTITIONER

## 2024-07-18 RX ORDER — NAPROXEN 500 MG/1
500 TABLET ORAL 2 TIMES DAILY PRN
Qty: 20 TABLET | Refills: 0 | Status: SHIPPED | OUTPATIENT
Start: 2024-07-18 | End: 2024-07-28

## 2024-07-19 NOTE — ED INITIAL ASSESSMENT (HPI)
Pt came in due to fall. Pt stated she slipped and fell yesterday and today she had another fall. Pt denies any head injury or LOC. Pt c/o right leg pain. Pt has easy non labored respirations.

## 2024-08-28 ENCOUNTER — TELEPHONE (OUTPATIENT)
Facility: CLINIC | Age: 54
End: 2024-08-28

## 2024-08-28 NOTE — TELEPHONE ENCOUNTER
----- Message from Carissa SANCHES sent at 10/31/2019 10:47 AM CDT -----  Regarding: 10 yr CLN recall w/Dr Amaya  Recall colon in 10 years per. Colon done 10/28/19 w/Dr Amaya     GI staff: please place recall in for colonoscopy in 10 years   Unable to enter 10 yr recall into epic at this time

## 2024-08-28 NOTE — ED INITIAL ASSESSMENT (HPI)
Called patient and reviewed nuclear med parathyroid scan. Parathyroid scan findings are consistent with a parathyroid adenoma on the right thyroid lobe. Patient voiced understanding. Thyroid US has not been read yet by radiology. Recommend he see Dr. Castro for his parathyroid adenoma. He voiced understanding and is agreeable to this. We will follow-up in about 2 months, sooner if needed.   Pt reports lower mid abd pain she states she has hx of fibroids. She states that she has been bleeding and saturating a pad every few hours. Pt states that the blood has slowed down. She has an US scheduled for Monday.

## 2024-08-28 NOTE — TELEPHONE ENCOUNTER
Received recall today. Patient due for colonoscopy in 2029.     Patient outreach entered for 10/2029 for colonoscopy.

## 2024-10-02 ENCOUNTER — IMMUNIZATION (OUTPATIENT)
Dept: LAB | Age: 54
End: 2024-10-02
Attending: EMERGENCY MEDICINE
Payer: COMMERCIAL

## 2024-10-02 DIAGNOSIS — Z23 NEED FOR VACCINATION: Primary | ICD-10-CM

## 2024-10-02 PROCEDURE — 90471 IMMUNIZATION ADMIN: CPT

## 2024-10-02 PROCEDURE — 90656 IIV3 VACC NO PRSV 0.5 ML IM: CPT

## 2024-10-02 PROCEDURE — 90480 ADMN SARSCOV2 VAC 1/ONLY CMP: CPT

## 2024-11-07 ENCOUNTER — PATIENT MESSAGE (OUTPATIENT)
Dept: FAMILY MEDICINE CLINIC | Facility: CLINIC | Age: 54
End: 2024-11-07

## 2024-11-08 ENCOUNTER — TELEPHONE (OUTPATIENT)
Dept: FAMILY MEDICINE CLINIC | Facility: CLINIC | Age: 54
End: 2024-11-08

## 2024-11-08 NOTE — TELEPHONE ENCOUNTER
Changed to acute for triage    Left a detailed message to cell (ok per NICHOLAS) regarding note below. Advised to call office

## 2024-11-08 NOTE — TELEPHONE ENCOUNTER
Patient sent my chart:    I haven’t been taking my high blood meds for several months (at least 6) because I was feeling fine & monitoring salt intake. It’s been a high lately like 180/101. Is it recommended that I just resume taking them or do I need to see you?     I called her to triage, could only leave a detailed message to contact office. Last visit was 1/22/2024 and she's scheduled for her physical on 1/27/2025.

## 2024-11-08 NOTE — TELEPHONE ENCOUNTER
Patient read her my chart message to call our office    Seakeeperhart User Last Read On   Samaria Frey 11/8/2024 10:21 AM

## 2024-11-11 ENCOUNTER — OFFICE VISIT (OUTPATIENT)
Dept: PULMONOLOGY | Facility: CLINIC | Age: 54
End: 2024-11-11

## 2024-11-11 VITALS
DIASTOLIC BLOOD PRESSURE: 70 MMHG | OXYGEN SATURATION: 97 % | HEIGHT: 63 IN | WEIGHT: 251 LBS | HEART RATE: 88 BPM | RESPIRATION RATE: 14 BRPM | SYSTOLIC BLOOD PRESSURE: 144 MMHG | BODY MASS INDEX: 44.47 KG/M2

## 2024-11-11 DIAGNOSIS — G47.33 OSA (OBSTRUCTIVE SLEEP APNEA): Primary | ICD-10-CM

## 2024-11-11 PROCEDURE — 99213 OFFICE O/P EST LOW 20 MIN: CPT | Performed by: INTERNAL MEDICINE

## 2024-11-11 NOTE — PROGRESS NOTES
The patient is a 54-year-old female who I know well from prior evaluation who comes in now for follow-up.  She is doing well although she has not been using her CPAP machine.  She feels like she has gained some weight.  She feels as though her snoring is worse.    Review of Systems:  Vision normal. Ear nose and throat normal. Bowel normal. Bladder function normal. No depression. No thyroid disease. No lymphatic system concerns.  No rash. Muscles and joints unremarkable. No weight loss no weight gain.    Physical Examination:  Vital signs normal. HEENT examination is unremarkable with pupils equal round and reactive to light and accommodation. Neck without adenopathy, thyromegaly, JVD nor bruit. Lungs clear to auscultation and percussion. Cardiac regular rate and rhythm no murmur. Abdomen nontender, without hepatosplenomegaly and no mass appreciable. Extremities and Musculoskeletal without clubbing cyanosis nor edema, and mobility acceptable. Neurologic grossly intact with symmetric tone and strength and reflex.    Assessment and plan:  1.  Obstructive sleep apnea-I encouraged the patient to use the CPAP device.  She should lose weight, make an appointment at the bariatric clinic, avoid alcohol and sedating drug and never drive if sleepy.  See me again in the office at the 1 year interval or sooner if needed and contact me promptly if new trouble.

## 2024-11-11 NOTE — TELEPHONE ENCOUNTER
DR Reyes ===FIDE .       Patient has read [facility] messages ,but still with no reply.   RN updated the CARE EVERY WHERE, no UC/ED note .     BP  Message [id number]  From  Светлана Gaffney RN To  Samaria Frey Sent and Delivered  11/9/2024 10:20 AM   Last Read in [facility]  11/9/2024 10:25 AM by Samaria Frey     BP  Message [id number]  From  Su Cottrell RN To  Samaria Frey Sent and Delivered  11/8/2024  9:36 AM   Last Read in [facility]  11/9/2024 10:25 AM by Samaria Frey          Future Appointments   Date Time Provider Department Center   11/11/2024  3:45 PM [facility]   1/27/2025  5:40 PM Leighton Reyes DO [facility]   3/13/2025  2:20 PM Ynes Chew MD [facility]

## 2024-11-20 ENCOUNTER — PATIENT MESSAGE (OUTPATIENT)
Dept: PULMONOLOGY | Facility: CLINIC | Age: 54
End: 2024-11-20

## 2024-11-20 DIAGNOSIS — G47.33 OSA (OBSTRUCTIVE SLEEP APNEA): Primary | ICD-10-CM

## 2024-11-21 NOTE — TELEPHONE ENCOUNTER
Per [facility] (p. #[phone]) patient's last data was from 11/7/23, patient's device may not be transmitting data/have modem, it is possible she is not using it., she may walk in to their office in [place] (also have other locations) from 9 am to 4:30 pm Monday through Friday/she can call them & do it through the mail.

## 2024-11-21 NOTE — TELEPHONE ENCOUNTER
Per [facility] (p. #[phone]) they would be able to look at patient's CPAP machine for repair/replacement, provide loaner, refresh her settings if need be when she comes for her appointment, and patient's current setting is 6 CWP 3 EPR. Dr. Wick- please sign rx if agreeable.

## 2024-11-26 ENCOUNTER — TELEPHONE (OUTPATIENT)
Dept: PULMONOLOGY | Facility: CLINIC | Age: 54
End: 2024-11-26

## 2024-11-26 DIAGNOSIS — G47.33 OSA (OBSTRUCTIVE SLEEP APNEA): Primary | ICD-10-CM

## 2024-11-26 NOTE — TELEPHONE ENCOUNTER
[facility] called to inform Dr. Wick that the patient qualified for a new CPAP machine and will need a new order for the new CPAP as well as the patient's face to face notes.     Fax # [phone]

## 2024-12-02 NOTE — TELEPHONE ENCOUNTER
Ras Mclain at [facility] is currently working on patient's order for new CPAP, but only the fulfillment status shows canceled in [employer] because they had a duplicate order in their other computer system.

## 2024-12-23 ENCOUNTER — TELEPHONE (OUTPATIENT)
Dept: PULMONOLOGY | Facility: CLINIC | Age: 54
End: 2024-12-23

## 2024-12-24 NOTE — TELEPHONE ENCOUNTER
Called patient and scheduled appointment for 3/17/25. Cancelled 1/14/25 since it is not within compliance range. Informed patient that appointment is needed for compliance and to make sure CPAP is treating apnea properly. Ensured CPAP data is being transmitted via [employer]. Added patient to waitlist  
Received fax from [employer] that patient is due for follow up for CPAP compliance between 1/19/25 and 3/19/25.     Called patient and left voicemail that appointment is needed. Provided patient with phone number to schedule appointment or option to use [employer]  
5

## 2025-01-07 DIAGNOSIS — I10 PRIMARY HYPERTENSION: Primary | ICD-10-CM

## 2025-01-09 ENCOUNTER — PATIENT MESSAGE (OUTPATIENT)
Dept: FAMILY MEDICINE CLINIC | Facility: CLINIC | Age: 55
End: 2025-01-09

## 2025-01-10 RX ORDER — AMLODIPINE BESYLATE 5 MG/1
5 TABLET ORAL DAILY
Qty: 90 TABLET | Refills: 3 | Status: SHIPPED | OUTPATIENT
Start: 2025-01-10

## 2025-01-10 NOTE — TELEPHONE ENCOUNTER
Dr. Reyes, patient has not medication left.     A refill request was received for:  Requested Prescriptions     Pending Prescriptions Disp Refills    amLODIPine 5 MG Oral Tab 90 tablet 3     Sig: Take 1 tablet (5 mg total) by mouth daily.     Last refill date:  03/28/24  Qty: 90  Last office visit: 01/22/2024   When is follow up due: 01/27/25     Future Appointments   Date Time Provider Department Center   1/27/2025  5:40 PM Leighton Reyes DO [facility]   3/13/2025  2:20 PM Ynes Chew MD [id number] [facility]   3/17/2025  4:45 PM Donn Wick MD [facility]WMOPULM [facility]   5/30/2025 10:00 AM [id number]   11/11/2025  3:45 PM Donn Wick MD [facility]UL[facility]

## 2025-01-10 NOTE — TELEPHONE ENCOUNTER
Medication has been reviewed and signed.  It has been sent to the pharmacy.  Please let the patient know.

## 2025-01-27 ENCOUNTER — OFFICE VISIT (OUTPATIENT)
Dept: FAMILY MEDICINE CLINIC | Facility: CLINIC | Age: 55
End: 2025-01-27

## 2025-01-27 VITALS
BODY MASS INDEX: 45.36 KG/M2 | HEIGHT: 63 IN | TEMPERATURE: 98 F | RESPIRATION RATE: 18 BRPM | HEART RATE: 83 BPM | OXYGEN SATURATION: 96 % | WEIGHT: 256 LBS | DIASTOLIC BLOOD PRESSURE: 84 MMHG | SYSTOLIC BLOOD PRESSURE: 145 MMHG

## 2025-01-27 DIAGNOSIS — Z00.00 ROUTINE PHYSICAL EXAMINATION: Primary | ICD-10-CM

## 2025-01-27 DIAGNOSIS — I10 PRIMARY HYPERTENSION: ICD-10-CM

## 2025-01-27 DIAGNOSIS — G47.33 OSA ON CPAP: ICD-10-CM

## 2025-01-27 DIAGNOSIS — Z23 NEED FOR PNEUMOCOCCAL VACCINATION: ICD-10-CM

## 2025-01-27 DIAGNOSIS — E66.01 CLASS 3 SEVERE OBESITY WITH SERIOUS COMORBIDITY AND BODY MASS INDEX (BMI) OF 45.0 TO 49.9 IN ADULT, UNSPECIFIED OBESITY TYPE (HCC): ICD-10-CM

## 2025-01-27 DIAGNOSIS — Z23 NEED FOR ZOSTER VACCINATION: ICD-10-CM

## 2025-01-27 DIAGNOSIS — Z12.31 ENCOUNTER FOR SCREENING MAMMOGRAM FOR BREAST CANCER: ICD-10-CM

## 2025-01-27 DIAGNOSIS — E66.813 CLASS 3 SEVERE OBESITY WITH SERIOUS COMORBIDITY AND BODY MASS INDEX (BMI) OF 45.0 TO 49.9 IN ADULT, UNSPECIFIED OBESITY TYPE (HCC): ICD-10-CM

## 2025-01-27 LAB
ALBUMIN SERPL-MCNC: 4.3 G/DL (ref 3.2–4.8)
ALBUMIN/GLOB SERPL: 1.2 {RATIO} (ref 1–2)
ALP LIVER SERPL-CCNC: 84 U/L
ALT SERPL-CCNC: 16 U/L
ANION GAP SERPL CALC-SCNC: 6 MMOL/L (ref 0–18)
AST SERPL-CCNC: 18 U/L (ref ?–34)
BASOPHILS # BLD AUTO: 0.03 X10(3) UL (ref 0–0.2)
BASOPHILS NFR BLD AUTO: 0.4 %
BILIRUB SERPL-MCNC: 0.4 MG/DL (ref 0.3–1.2)
BILIRUB UR QL: NEGATIVE
BUN BLD-MCNC: 18 MG/DL (ref 9–23)
BUN/CREAT SERPL: 22.8 (ref 10–20)
CALCIUM BLD-MCNC: 9.7 MG/DL (ref 8.7–10.4)
CHLORIDE SERPL-SCNC: 104 MMOL/L (ref 98–112)
CHOLEST SERPL-MCNC: 195 MG/DL (ref ?–200)
CLARITY UR: CLEAR
CO2 SERPL-SCNC: 30 MMOL/L (ref 21–32)
CREAT BLD-MCNC: 0.79 MG/DL
DEPRECATED RDW RBC AUTO: 54.4 FL (ref 35.1–46.3)
EGFRCR SERPLBLD CKD-EPI 2021: 88 ML/MIN/1.73M2 (ref 60–?)
EOSINOPHIL # BLD AUTO: 0.07 X10(3) UL (ref 0–0.7)
EOSINOPHIL NFR BLD AUTO: 0.9 %
ERYTHROCYTE [DISTWIDTH] IN BLOOD BY AUTOMATED COUNT: 16.4 % (ref 11–15)
FASTING PATIENT LIPID ANSWER: NO
FASTING STATUS PATIENT QL REPORTED: NO
GLOBULIN PLAS-MCNC: 3.5 G/DL (ref 2–3.5)
GLUCOSE BLD-MCNC: 100 MG/DL (ref 70–99)
GLUCOSE UR-MCNC: NORMAL MG/DL
HCT VFR BLD AUTO: 40.6 %
HDLC SERPL-MCNC: 42 MG/DL (ref 40–59)
HGB BLD-MCNC: 12.4 G/DL
HGB UR QL STRIP.AUTO: NEGATIVE
IMM GRANULOCYTES # BLD AUTO: 0.03 X10(3) UL (ref 0–1)
IMM GRANULOCYTES NFR BLD: 0.4 %
KETONES UR-MCNC: NEGATIVE MG/DL
LDLC SERPL CALC-MCNC: 130 MG/DL (ref ?–100)
LEUKOCYTE ESTERASE UR QL STRIP.AUTO: NEGATIVE
LYMPHOCYTES # BLD AUTO: 2.19 X10(3) UL (ref 1–4)
LYMPHOCYTES NFR BLD AUTO: 27.3 %
MCH RBC QN AUTO: 27.7 PG (ref 26–34)
MCHC RBC AUTO-ENTMCNC: 30.5 G/DL (ref 31–37)
MCV RBC AUTO: 90.8 FL
MONOCYTES # BLD AUTO: 0.63 X10(3) UL (ref 0.1–1)
MONOCYTES NFR BLD AUTO: 7.9 %
NEUTROPHILS # BLD AUTO: 5.07 X10 (3) UL (ref 1.5–7.7)
NEUTROPHILS # BLD AUTO: 5.07 X10(3) UL (ref 1.5–7.7)
NEUTROPHILS NFR BLD AUTO: 63.1 %
NITRITE UR QL STRIP.AUTO: NEGATIVE
NONHDLC SERPL-MCNC: 153 MG/DL (ref ?–130)
OSMOLALITY SERPL CALC.SUM OF ELEC: 292 MOSM/KG (ref 275–295)
PH UR: 6.5 [PH] (ref 5–8)
PLATELET # BLD AUTO: 376 10(3)UL (ref 150–450)
POTASSIUM SERPL-SCNC: 4 MMOL/L (ref 3.5–5.1)
PROT SERPL-MCNC: 7.8 G/DL (ref 5.7–8.2)
RBC # BLD AUTO: 4.47 X10(6)UL
SODIUM SERPL-SCNC: 140 MMOL/L (ref 136–145)
SP GR UR STRIP: 1.03 (ref 1–1.03)
TRIGL SERPL-MCNC: 130 MG/DL (ref 30–149)
TSI SER-ACNC: 1.08 UIU/ML (ref 0.55–4.78)
UROBILINOGEN UR STRIP-ACNC: NORMAL
VLDLC SERPL CALC-MCNC: 23 MG/DL (ref 0–30)
WBC # BLD AUTO: 8 X10(3) UL (ref 4–11)

## 2025-01-27 PROCEDURE — 90471 IMMUNIZATION ADMIN: CPT | Performed by: FAMILY MEDICINE

## 2025-01-27 PROCEDURE — 99396 PREV VISIT EST AGE 40-64: CPT | Performed by: FAMILY MEDICINE

## 2025-01-27 PROCEDURE — 90677 PCV20 VACCINE IM: CPT | Performed by: FAMILY MEDICINE

## 2025-01-28 NOTE — PATIENT INSTRUCTIONS
All adult screening ordered and done appropriate for patient's age and gender and risk factors and complaints.  Recommend weight loss via daily exercising and consistent healthy dietary changes.  Encouraged safe physical fitness and daily physical activity daily.  Medication reviewed and renewed where needed and appropriate.  Encouraged physical fitness and daily physical activity daily.  Comply with medications.

## 2025-02-02 NOTE — PROGRESS NOTES
Subjective:     Patient ID: Samaria Frey is a 54 year old female.    This patient is a 54-year-old morbidly obese by definition/hypertensive -American female here for complete preventive care physical and for status update on any confirmed chronic medical illnesses and follow up on any previous labs or procedures that were suggestive or in need of further work up. Colonoscopy is current. Bowel and bladder functions are intact.    Regarding hypertension, patient denies headaches, chest pain, dizziness, shortness of breath, acute visual changes, and/or exertional fatigue.    Patient consents to both shingles and pneumococcal vaccine.    Patient due for mammogram-order generated.              History/Other:   Review of Systems  Current Outpatient Medications   Medication Sig Dispense Refill    amLODIPine 5 MG Oral Tab Take 1 tablet (5 mg total) by mouth daily. 90 tablet 3    benzonatate 200 MG Oral Cap Take 1 capsule (200 mg total) by mouth 3 (three) times daily as needed for cough. 15 capsule 0     Allergies:Allergies[1]    Past Medical History:    Hepatic lesion    multiple, FNH/hemangioma/adenoma? repeat MRI in Dec 2020    History of blood transfusion    Screen for colon cancer    repeat CLN in 10 yrs    Sleep apnea      Past Surgical History:   Procedure Laterality Date    Total abdom hysterectomy  04/03/2023      Family History   Problem Relation Age of Onset    Bipolar Disorder Mother     Diabetes Mother     Breast Cancer Maternal Grandmother         age unknown    Breast Cancer Maternal Aunt         age unknown    Breast Cancer Maternal Cousin Female         age unknown    Breast Cancer Paternal Cousin Female         age unknown    Cancer Maternal Aunt         pancreatic/age unknown    Other (pulmonary HTN) Father       Social History:   Social History     Socioeconomic History    Marital status: Single   Tobacco Use    Smoking status: Former     Current packs/day: 0.00     Average packs/day: 0.5  packs/day for 23.0 years (11.5 ttl pk-yrs)     Types: Cigarettes     Start date: 1994     Quit date: 2017     Years since quittin.6     Passive exposure: Past    Smokeless tobacco: Never   Vaping Use    Vaping status: Never Used   Substance and Sexual Activity    Alcohol use: Not Currently    Drug use: Never    Sexual activity: Not Currently     Partners: Male     Social Drivers of Health      [facility]    Social Connections    [facility]    Housing Stability        Objective:   Vitals:    25 1749   BP: 145/84   Pulse: 83   Resp: 18   Temp: 98.2 °F (36.8 °C)       Physical Exam  Constitutional:       General: She is not in acute distress.     Appearance: She is obese. She is not ill-appearing.   HENT:      Head: Normocephalic and atraumatic.      Right Ear: Tympanic membrane normal.      Left Ear: Tympanic membrane normal.      Nose: Nose normal.      Mouth/Throat:      Mouth: Mucous membranes are moist.   Neck:      Thyroid: No thyromegaly.   Cardiovascular:      Rate and Rhythm: Normal rate and regular rhythm.      Heart sounds:      No gallop.   Pulmonary:      Effort: Pulmonary effort is normal.      Breath sounds: Normal breath sounds.   Abdominal:      General: Bowel sounds are normal.      Palpations: Abdomen is soft. There is no mass.   Neurological:      Mental Status: She is alert and oriented to person, place, and time.   Psychiatric:         Mood and Affect: Mood normal.         Assessment & Plan:   1. Routine physical examination  The following labs have been ordered.  - CBC W Differential W Platelet [E]  - Comp Metabolic Panel (14) [E]  - Lipid Panel [E]  - TSH W Reflex To Free T4  - Urinalysis, Routine [E]    2. Need for pneumococcal vaccination  Ordered and administered.  - Prevnar 20 (PCV20) [87635]    3. Encounter for screening mammogram for breast  cancer  Ordered.  - [facility] SOURAV 2D+3D SCREENING BILAT (CPT=77067/09991); Future    4. TIEN on CPAP  Compliance emphasized and encouraged.    5. Primary hypertension  Blood pressure measures just shy of the goal of less than 140/90 with consistency.  Compliance with medication encouraged.  Minimize salt intake.  See patient instructions.    6. Need for zoster vaccination  Ordered and administered on today.  - Zoster Recombinant Adjuvanted (Shingrix -Shingles) [65256]    7. Class 3 severe obesity with serious comorbidity and body mass index (BMI) of 45.0 to 49.9 in adult, unspecified obesity type (HCC)  Recommend weight loss via daily exercising and consistent healthy dietary changes.  May benefit from bariatrics or weight management consult.      Orders Placed This Encounter   Procedures    CBC W Differential W Platelet [E]    Comp Metabolic Panel (14) [E]    Lipid Panel [E]    TSH W Reflex To Free T4    Urinalysis, Routine [E]    Prevnar 20 (PCV20) [51122]    Zoster Recombinant Adjuvanted (Shingrix -Shingles) [86900]       Meds This Visit:  Requested Prescriptions      No prescriptions requested or ordered in this encounter       Imaging & Referrals:  PCV20 VACCINE FOR INTRAMUSCULAR USE  ZOSTER VACC RECOMBINANT IM NJX  [facility] SOURAV 2D+3D SCREENING BILAT (CPT=77067/17598)     Patient Instructions   All adult screening ordered and done appropriate for patient's age and gender and risk factors and complaints.  Recommend weight loss via daily exercising and consistent healthy dietary changes.  Encouraged safe physical fitness and daily physical activity daily.  Medication reviewed and renewed where needed and appropriate.  Encouraged physical fitness and daily physical activity daily.  Comply with medications.      Return in about 1 year (around 1/27/2026), or if symptoms worsen or fail to improve.         [1] No Known Allergies     responds to pain/cranial nerves intact/sensation intact/responds to verbal commands/alert and oriented x 3/normal strength

## 2025-03-17 ENCOUNTER — TELEPHONE (OUTPATIENT)
Dept: PULMONOLOGY | Facility: CLINIC | Age: 55
End: 2025-03-17

## 2025-03-17 ENCOUNTER — VIRTUAL PHONE E/M (OUTPATIENT)
Dept: PULMONOLOGY | Facility: CLINIC | Age: 55
End: 2025-03-17

## 2025-03-17 DIAGNOSIS — G47.33 OSA (OBSTRUCTIVE SLEEP APNEA): Primary | ICD-10-CM

## 2025-03-17 NOTE — PROGRESS NOTES
Virtual Telephone Check-In    Samaria Frey verbally consents to a Virtual/Telephone Check-In visit on 03/17/25.  Patient has been referred to the [facility] website at [email or web address] to review the yearly Consent to Treat document.    Patient understands and accepts financial responsibility for any deductible, co-insurance and/or co-pays associated with this service.    Duration of the service: 10 minutes      Summary of topics discussed:     The patient is a 54-year-old female who I know well from prior evaluation with home I am doing a telephone visit today.  She notes that her sleep is going well.  She only missed 1 day over the past month of not using the CPAP.  Her average daily usage is 7 hours and 47 minutes and residual events are 1.5/h.  She is benefiting from ongoing usage.    Review of Systems:  Vision normal. Ear nose and throat normal. Bowel normal. Bladder function normal. No depression. No thyroid disease. No lymphatic system concerns.  No rash. Muscles and joints unremarkable. No weight loss no weight gain.    Physical examination-none performed as this was a telephone visit    Assessment and plan:  1.  Obstructive sleep apnea-excellent control.    Recommendations: Vigilance with CPAP every night all night, weight loss, avoid alcohol and sedating drug and never drive if sleepy.  See me in the office at the 1 year interval or sooner if needed.            Donn Wick MD

## 2025-03-25 ENCOUNTER — TELEPHONE (OUTPATIENT)
Dept: PULMONOLOGY | Facility: CLINIC | Age: 55
End: 2025-03-25

## 2025-03-25 NOTE — TELEPHONE ENCOUNTER
Received fax from [employer] requesting office visit note after 1/19/2025. Faxed office 3/17/2025 visit note to [employer]. Confirmation received

## 2025-05-30 DIAGNOSIS — I10 PRIMARY HYPERTENSION: ICD-10-CM

## 2025-05-30 RX ORDER — AMLODIPINE BESYLATE 5 MG/1
5 TABLET ORAL DAILY
Qty: 90 TABLET | Refills: 3 | OUTPATIENT
Start: 2025-05-30
